# Patient Record
Sex: MALE | Race: BLACK OR AFRICAN AMERICAN | Employment: UNEMPLOYED | URBAN - METROPOLITAN AREA
[De-identification: names, ages, dates, MRNs, and addresses within clinical notes are randomized per-mention and may not be internally consistent; named-entity substitution may affect disease eponyms.]

---

## 2022-01-12 ENCOUNTER — HOSPITAL ENCOUNTER (EMERGENCY)
Facility: CLINIC | Age: 37
Discharge: HOME | End: 2022-01-12
Attending: EMERGENCY MEDICINE | Admitting: EMERGENCY MEDICINE

## 2022-01-12 ENCOUNTER — APPOINTMENT (OUTPATIENT)
Dept: CT IMAGING | Facility: CLINIC | Age: 37
End: 2022-01-12
Attending: EMERGENCY MEDICINE

## 2022-01-12 VITALS
SYSTOLIC BLOOD PRESSURE: 131 MMHG | HEIGHT: 74 IN | RESPIRATION RATE: 29 BRPM | OXYGEN SATURATION: 96 % | HEART RATE: 83 BPM | DIASTOLIC BLOOD PRESSURE: 81 MMHG | TEMPERATURE: 98 F | WEIGHT: 213.63 LBS | BODY MASS INDEX: 27.42 KG/M2

## 2022-01-12 DIAGNOSIS — E87.6 HYPOKALEMIA: ICD-10-CM

## 2022-01-12 DIAGNOSIS — J18.9 PNEUMONIA OF LEFT LUNG DUE TO INFECTIOUS ORGANISM, UNSPECIFIED PART OF LUNG: ICD-10-CM

## 2022-01-12 LAB
ALBUMIN SERPL-MCNC: 3.1 G/DL (ref 3.5–5)
ALP SERPL-CCNC: 66 U/L (ref 45–120)
ALT SERPL W P-5'-P-CCNC: 21 U/L (ref 0–45)
ANION GAP SERPL CALCULATED.3IONS-SCNC: 12 MMOL/L (ref 5–18)
AST SERPL W P-5'-P-CCNC: 47 U/L (ref 0–40)
ATRIAL RATE - MUSE: 83 BPM
BASOPHILS # BLD MANUAL: 0 10E3/UL (ref 0–0.2)
BASOPHILS NFR BLD MANUAL: 0 %
BILIRUB SERPL-MCNC: 0.4 MG/DL (ref 0–1)
BUN SERPL-MCNC: 8 MG/DL (ref 8–22)
CALCIUM SERPL-MCNC: 8.6 MG/DL (ref 8.5–10.5)
CHLORIDE BLD-SCNC: 104 MMOL/L (ref 98–107)
CO2 SERPL-SCNC: 29 MMOL/L (ref 22–31)
CREAT SERPL-MCNC: 0.69 MG/DL (ref 0.7–1.3)
D DIMER PPP FEU-MCNC: 2.15 UG/ML FEU (ref 0–0.5)
DIASTOLIC BLOOD PRESSURE - MUSE: 69 MMHG
EOSINOPHIL # BLD MANUAL: 0 10E3/UL (ref 0–0.7)
EOSINOPHIL NFR BLD MANUAL: 0 %
ERYTHROCYTE [DISTWIDTH] IN BLOOD BY AUTOMATED COUNT: 13 % (ref 10–15)
FLUAV RNA SPEC QL NAA+PROBE: NEGATIVE
FLUBV RNA RESP QL NAA+PROBE: NEGATIVE
GFR SERPL CREATININE-BSD FRML MDRD: >90 ML/MIN/1.73M2
GLUCOSE BLD-MCNC: 112 MG/DL (ref 70–125)
HCT VFR BLD AUTO: 35.5 % (ref 40–53)
HGB BLD-MCNC: 12.1 G/DL (ref 13.3–17.7)
INTERPRETATION ECG - MUSE: NORMAL
LYMPHOCYTES # BLD MANUAL: 3.7 10E3/UL (ref 0.8–5.3)
LYMPHOCYTES NFR BLD MANUAL: 28 %
MCH RBC QN AUTO: 33.6 PG (ref 26.5–33)
MCHC RBC AUTO-ENTMCNC: 34.1 G/DL (ref 31.5–36.5)
MCV RBC AUTO: 99 FL (ref 78–100)
MONOCYTES # BLD MANUAL: 1.6 10E3/UL (ref 0–1.3)
MONOCYTES NFR BLD MANUAL: 12 %
NEUTROPHILS # BLD MANUAL: 8 10E3/UL (ref 1.6–8.3)
NEUTROPHILS NFR BLD MANUAL: 60 %
P AXIS - MUSE: 48 DEGREES
PLAT MORPH BLD: ABNORMAL
PLATELET # BLD AUTO: 413 10E3/UL (ref 150–450)
POTASSIUM BLD-SCNC: 2.7 MMOL/L (ref 3.5–5)
PR INTERVAL - MUSE: 128 MS
PROT SERPL-MCNC: 7.7 G/DL (ref 6–8)
QRS DURATION - MUSE: 106 MS
QT - MUSE: 400 MS
QTC - MUSE: 470 MS
R AXIS - MUSE: 45 DEGREES
RBC # BLD AUTO: 3.6 10E6/UL (ref 4.4–5.9)
RBC MORPH BLD: ABNORMAL
SARS-COV-2 RNA RESP QL NAA+PROBE: NEGATIVE
SODIUM SERPL-SCNC: 145 MMOL/L (ref 136–145)
SYSTOLIC BLOOD PRESSURE - MUSE: 114 MMHG
T AXIS - MUSE: -32 DEGREES
TROPONIN I SERPL-MCNC: <0.01 NG/ML (ref 0–0.29)
VENTRICULAR RATE- MUSE: 83 BPM
WBC # BLD AUTO: 13.3 10E3/UL (ref 4–11)

## 2022-01-12 PROCEDURE — 85027 COMPLETE CBC AUTOMATED: CPT | Performed by: EMERGENCY MEDICINE

## 2022-01-12 PROCEDURE — 87636 SARSCOV2 & INF A&B AMP PRB: CPT | Performed by: EMERGENCY MEDICINE

## 2022-01-12 PROCEDURE — 258N000003 HC RX IP 258 OP 636: Performed by: EMERGENCY MEDICINE

## 2022-01-12 PROCEDURE — 80053 COMPREHEN METABOLIC PANEL: CPT | Performed by: EMERGENCY MEDICINE

## 2022-01-12 PROCEDURE — 71275 CT ANGIOGRAPHY CHEST: CPT

## 2022-01-12 PROCEDURE — 84484 ASSAY OF TROPONIN QUANT: CPT | Performed by: EMERGENCY MEDICINE

## 2022-01-12 PROCEDURE — 85379 FIBRIN DEGRADATION QUANT: CPT | Performed by: EMERGENCY MEDICINE

## 2022-01-12 PROCEDURE — 96374 THER/PROPH/DIAG INJ IV PUSH: CPT | Mod: 59

## 2022-01-12 PROCEDURE — 250N000011 HC RX IP 250 OP 636: Performed by: EMERGENCY MEDICINE

## 2022-01-12 PROCEDURE — 36415 COLL VENOUS BLD VENIPUNCTURE: CPT | Performed by: EMERGENCY MEDICINE

## 2022-01-12 PROCEDURE — 99285 EMERGENCY DEPT VISIT HI MDM: CPT | Mod: 25

## 2022-01-12 PROCEDURE — C9803 HOPD COVID-19 SPEC COLLECT: HCPCS

## 2022-01-12 PROCEDURE — 250N000013 HC RX MED GY IP 250 OP 250 PS 637: Performed by: EMERGENCY MEDICINE

## 2022-01-12 PROCEDURE — 93005 ELECTROCARDIOGRAM TRACING: CPT | Performed by: EMERGENCY MEDICINE

## 2022-01-12 PROCEDURE — 96361 HYDRATE IV INFUSION ADD-ON: CPT

## 2022-01-12 RX ORDER — KETOROLAC TROMETHAMINE 15 MG/ML
15 INJECTION, SOLUTION INTRAMUSCULAR; INTRAVENOUS ONCE
Status: COMPLETED | OUTPATIENT
Start: 2022-01-12 | End: 2022-01-12

## 2022-01-12 RX ORDER — CEFDINIR 300 MG/1
300 CAPSULE ORAL 2 TIMES DAILY
Qty: 14 CAPSULE | Refills: 0 | Status: SHIPPED | OUTPATIENT
Start: 2022-01-12 | End: 2022-01-19

## 2022-01-12 RX ORDER — SODIUM CHLORIDE 9 MG/ML
INJECTION, SOLUTION INTRAVENOUS CONTINUOUS
Status: DISCONTINUED | OUTPATIENT
Start: 2022-01-12 | End: 2022-01-12 | Stop reason: HOSPADM

## 2022-01-12 RX ORDER — IOPAMIDOL 755 MG/ML
100 INJECTION, SOLUTION INTRAVASCULAR ONCE
Status: COMPLETED | OUTPATIENT
Start: 2022-01-12 | End: 2022-01-12

## 2022-01-12 RX ORDER — AZITHROMYCIN 250 MG/1
TABLET, FILM COATED ORAL
Qty: 6 TABLET | Refills: 0 | Status: SHIPPED | OUTPATIENT
Start: 2022-01-12 | End: 2022-01-17

## 2022-01-12 RX ORDER — POTASSIUM CHLORIDE 1.5 G/1.58G
40 POWDER, FOR SOLUTION ORAL ONCE
Status: COMPLETED | OUTPATIENT
Start: 2022-01-12 | End: 2022-01-12

## 2022-01-12 RX ADMIN — IOPAMIDOL 75 ML: 755 INJECTION, SOLUTION INTRAVENOUS at 05:00

## 2022-01-12 RX ADMIN — POTASSIUM CHLORIDE 40 MEQ: 1.5 POWDER, FOR SOLUTION ORAL at 04:22

## 2022-01-12 RX ADMIN — SODIUM CHLORIDE 1000 ML: 9 INJECTION, SOLUTION INTRAVENOUS at 03:35

## 2022-01-12 RX ADMIN — KETOROLAC TROMETHAMINE 15 MG: 15 INJECTION, SOLUTION INTRAMUSCULAR; INTRAVENOUS at 03:35

## 2022-01-12 ASSESSMENT — ENCOUNTER SYMPTOMS
DIARRHEA: 0
COUGH: 1
SHORTNESS OF BREATH: 1
FEVER: 1
ABDOMINAL PAIN: 0
NAUSEA: 1

## 2022-01-12 ASSESSMENT — MIFFLIN-ST. JEOR: SCORE: 1978.75

## 2022-01-12 NOTE — ED PROVIDER NOTES
EMERGENCY DEPARTMENT ENCOUNTER      NAME: Roland Londono  AGE: 36 year old male  YOB: 1985  MRN: [id number]  EVALUATION DATE & TIME: 1/12/2022  2:55 AM    PCP: No primary care provider on file.    ED PROVIDER: True Negron M.D.      Chief Complaint   Patient presents with     Covid Concern         FINAL IMPRESSION:  1. Pneumonia of left lung due to infectious organism, unspecified part of lung    2. Hypokalemia          ED COURSE & MEDICAL DECISION MAKING:    Pertinent Labs & Imaging studies reviewed. (See chart for details)  36 year old male presents to the Emergency Department for evaluation of cough shortness of breath.  Having some left-sided chest pain.  Thinks he may have had COVID but was never tested.  COVID test was actually negative here.  He has a mildly elevated white count..  Did get a CT scan as his D-dimer was elevated.  This does not show PE but does show left-sided pneumonia.  We will treat him with antibiotics for this.  EKG done here does not show acute abnormalities.  Does have voltage criteria for ventricular hypertrophy.  He has no previous EKG.  I do not think this is new.  He does have a low potassium.  Given oral and IV potassium here.  Patient feeling better after IV fluids, IV Toradol and potassium.  At this time I do think he safe for discharge.  Oxygen saturation is normal.  We will treat him with azithromycin and Omnicef at home.  Follow-up with primary.  I did get a referral from primary care provider as he does not have 1.  3:15 AM I met with the patient to gather history and to perform my initial exam. I discussed the plan for care while in the Emergency Department. PPE: N95 mask, surgical mask, eye shield, and gloves.    At the conclusion of the encounter I discussed the results of all of the tests and the disposition. The questions were answered. The patient or family acknowledged understanding and was agreeable with the care plan.       MEDICATIONS GIVEN IN THE  EMERGENCY:  Medications   0.9% sodium chloride BOLUS (0 mLs Intravenous Stopped 1/12/22 0422)     Followed by   sodium chloride 0.9% infusion (has no administration in time range)   ketorolac (TORADOL) injection 15 mg (15 mg Intravenous Given 1/12/22 0335)   potassium chloride (KLOR-CON) Packet 40 mEq (40 mEq Oral Given 1/12/22 0422)   iopamidol (ISOVUE-370) solution 100 mL (75 mLs Intravenous Given 1/12/22 0500)       NEW PRESCRIPTIONS STARTED AT TODAY'S ER VISIT  Discharge Medication List as of 1/12/2022  5:32 AM      START taking these medications    Details   azithromycin (ZITHROMAX Z-SHARON) 250 MG tablet Two tablets on the first day, then one tablet daily for the next 4 days, Disp-6 tablet, R-0, Local Print      cefdinir (OMNICEF) 300 MG capsule Take 1 capsule (300 mg) by mouth 2 times daily for 7 days, Disp-14 capsule, R-0, Local Print                =================================================================    HPI    Patient information was obtained from: Patient     Use of : N/A       Roland Londono is a 36 year old male with a pertinent history of alcohol dependence who presents to this ED by walk in for evaluation of COVID-19 concern.  Patient is having left-sided chest pain.  He states for 2 weeks he has had cough and shortness of breath.  He thought he had COVID but was never tested.  He is having worsening pain in his left side.  This wraps around to his back.  He has had worsening shortness of breath.  He is a smoker.  Had some nausea which is improving.  Has not been eating much.  Has not had much of an appetite.  Decreased bowel movements.  No difficulty urinating.  He is not vaccinated versus COVID    REVIEW OF SYSTEMS   Review of Systems   Constitutional: Positive for fever.   Respiratory: Positive for cough and shortness of breath.    Cardiovascular: Positive for chest pain.   Gastrointestinal: Positive for nausea. Negative for abdominal pain and diarrhea.   All other systems  "reviewed and are negative.       PAST MEDICAL HISTORY:  No past medical history on file.    PAST SURGICAL HISTORY:  No past surgical history on file.        CURRENT MEDICATIONS:    Current Facility-Administered Medications   Medication     sodium chloride 0.9% infusion     Current Outpatient Medications   Medication     azithromycin (ZITHROMAX Z-SHARON) 250 MG tablet     cefdinir (OMNICEF) 300 MG capsule     ibuprofen (ADVIL,MOTRIN) 800 MG tablet     ondansetron (ZOFRAN ODT) 4 MG disintegrating tablet         ALLERGIES:  No Known Allergies    FAMILY HISTORY:  Family History   Problem Relation Age of Onset     Asthma Daughter        SOCIAL HISTORY:   Social History     Socioeconomic History     Marital status: Single     Spouse name: Not on file     Number of children: Not on file     Years of education: Not on file     Highest education level: Not on file   Occupational History     Not on file   Tobacco Use     Smoking status: Current Every Day Smoker     Packs/day: 0.50     Years: 10.00     Pack years: 5.00     Smokeless tobacco: Not on file   Substance and Sexual Activity     Alcohol use: Yes     Alcohol/week: 15.0 standard drinks     Drug use: Yes     Types: Marijuana     Sexual activity: Yes     Partners: Female   Other Topics Concern     Not on file   Social History Narrative     Not on file     Social Determinants of Health     Financial Resource Strain: Not on file   Food Insecurity: Not on file   Transportation Needs: Not on file   Physical Activity: Not on file   Stress: Not on file   Social Connections: Not on file   Intimate Partner Violence: Not on file   Housing Stability: Not on file       VITALS:  /81   Pulse 83   Temp 98  F (36.7  C) (Oral)   Resp 29   Ht 1.88 m (6' 2\")   Wt 96.9 kg (213 lb 10 oz)   SpO2 96%   BMI 27.43 kg/m      PHYSICAL EXAM    Physical Exam  Constitutional:       General: He is not in acute distress.     Appearance: He is not diaphoretic.   HENT:      Head: Atraumatic. "   Eyes:      General: No scleral icterus.     Pupils: Pupils are equal, round, and reactive to light.   Cardiovascular:      Heart sounds: Normal heart sounds.   Pulmonary:      Effort: No respiratory distress.      Breath sounds: Normal breath sounds.   Abdominal:      General: Bowel sounds are normal.      Palpations: Abdomen is soft.      Tenderness: There is no abdominal tenderness.   Musculoskeletal:         General: No tenderness.   Skin:     General: Skin is warm.      Findings: No rash.           LAB:  All pertinent labs reviewed and interpreted.  Labs Ordered and Resulted from Time of ED Arrival to Time of ED Departure   D DIMER QUANTITATIVE - Abnormal       Result Value    D-Dimer Quantitative 2.15 (*)    COMPREHENSIVE METABOLIC PANEL - Abnormal    Sodium 145      Potassium 2.7 (*)     Chloride 104      Carbon Dioxide (CO2) 29      Anion Gap 12      Urea Nitrogen 8      Creatinine 0.69 (*)     Calcium 8.6      Glucose 112      Alkaline Phosphatase 66      AST 47 (*)     ALT 21      Protein Total 7.7      Albumin 3.1 (*)     Bilirubin Total 0.4      GFR Estimate >90     CBC WITH PLATELETS AND DIFFERENTIAL - Abnormal    WBC Count 13.3 (*)     RBC Count 3.60 (*)     Hemoglobin 12.1 (*)     Hematocrit 35.5 (*)     MCV 99      MCH 33.6 (*)     MCHC 34.1      RDW 13.0      Platelet Count 413     DIFFERENTIAL - Abnormal    % Neutrophils 60      % Lymphocytes 28      % Monocytes 12      % Eosinophils 0      % Basophils 0      Absolute Neutrophils 8.0      Absolute Lymphocytes 3.7      Absolute Monocytes 1.6 (*)     Absolute Eosinophils 0.0      Absolute Basophils 0.0      RBC Morphology Confirmed RBC Indices      Platelet Assessment        Value: Automated Count Confirmed. Platelet morphology is normal.   TROPONIN I - Normal    Troponin I <0.01     INFLUENZA A/B & SARS-COV2 PCR MULTIPLEX - Normal    Influenza A PCR Negative      Influenza B PCR Negative      SARS CoV2 PCR Negative         RADIOLOGY:  Reviewed all  pertinent imaging. Please see official radiology report.  CT Chest Pulmonary Embolism w Contrast   Final Result   IMPRESSION:   1.  No pulmonary embolus, aortic dissection, or aneurysm.   2.  Left upper and left lower lobe groundglass and reticular consolidation, may reflect pneumonia in the proper clinical context, recommend follow-up to resolution.         Imaging features are atypical or uncommonly reported for (COVID-19) pneumonia. Alternative diagnoses should be considered.          EKG:    Performed at:333  Impression: Sinus rhythm.  Will treat anterior for LVH.  No previous EKG available.  Sinus rhythm intergrade of 83.  MI of 128.  .  QTc 470    I have independently reviewed and interpreted the EKG(s) documented above.      I, Kelvin Gutierres, am serving as a scribe to document services personally performed by Dr. True Negron, based on my observation and the provider's statements to me. I, True Negron MD attest that Kelvin Gutierres is acting in a scribe capacity, has observed my performance of the services and has documented them in accordance with my direction.    True Negron M.D.  Emergency Medicine  [facility] EMERGENCY ROOM  [address] [facility] [id number] [phone]  Dept: [phone]     True Negron MD  01/12/22 7639

## 2022-01-12 NOTE — ED TRIAGE NOTES
Patient had covid-like symptoms that started around kishore but never tested. Has started having pain below his ribs on the left side that wraps around to his back; also states he feels like he's been increasingly SOB during this time; he does smoke cigarettes   
no abdominal pain, no bloating, no constipation, no diarrhea, no nausea and no vomiting.

## 2022-02-07 ENCOUNTER — HOSPITAL ENCOUNTER (EMERGENCY)
Facility: CLINIC | Age: 37
Discharge: HOME | End: 2022-02-07
Attending: FAMILY MEDICINE | Admitting: FAMILY MEDICINE

## 2022-02-07 ENCOUNTER — APPOINTMENT (OUTPATIENT)
Dept: RADIOLOGY | Facility: CLINIC | Age: 37
End: 2022-02-07
Attending: FAMILY MEDICINE

## 2022-02-07 VITALS
SYSTOLIC BLOOD PRESSURE: 161 MMHG | HEIGHT: 74 IN | RESPIRATION RATE: 19 BRPM | WEIGHT: 217 LBS | BODY MASS INDEX: 27.85 KG/M2 | OXYGEN SATURATION: 100 % | TEMPERATURE: 98 F | DIASTOLIC BLOOD PRESSURE: 94 MMHG | HEART RATE: 86 BPM

## 2022-02-07 DIAGNOSIS — F10.920 ALCOHOLIC INTOXICATION WITHOUT COMPLICATION (H): ICD-10-CM

## 2022-02-07 DIAGNOSIS — R55 SYNCOPE, UNSPECIFIED SYNCOPE TYPE: ICD-10-CM

## 2022-02-07 LAB
ANION GAP SERPL CALCULATED.3IONS-SCNC: 21 MMOL/L (ref 5–18)
BASOPHILS # BLD AUTO: 0 10E3/UL (ref 0–0.2)
BASOPHILS NFR BLD AUTO: 1 %
BUN SERPL-MCNC: 8 MG/DL (ref 8–22)
CALCIUM SERPL-MCNC: 9.8 MG/DL (ref 8.5–10.5)
CHLORIDE BLD-SCNC: 102 MMOL/L (ref 98–107)
CO2 SERPL-SCNC: 21 MMOL/L (ref 22–31)
CREAT SERPL-MCNC: 0.81 MG/DL (ref 0.7–1.3)
EOSINOPHIL # BLD AUTO: 0 10E3/UL (ref 0–0.7)
EOSINOPHIL NFR BLD AUTO: 0 %
ERYTHROCYTE [DISTWIDTH] IN BLOOD BY AUTOMATED COUNT: 14.6 % (ref 10–15)
GFR SERPL CREATININE-BSD FRML MDRD: >90 ML/MIN/1.73M2
GLUCOSE BLD-MCNC: 71 MG/DL (ref 70–125)
HCT VFR BLD AUTO: 37 % (ref 40–53)
HGB BLD-MCNC: 12.2 G/DL (ref 13.3–17.7)
IMM GRANULOCYTES # BLD: 0 10E3/UL
IMM GRANULOCYTES NFR BLD: 0 %
LYMPHOCYTES # BLD AUTO: 2.4 10E3/UL (ref 0.8–5.3)
LYMPHOCYTES NFR BLD AUTO: 34 %
MAGNESIUM SERPL-MCNC: 1.4 MG/DL (ref 1.8–2.6)
MCH RBC QN AUTO: 32.9 PG (ref 26.5–33)
MCHC RBC AUTO-ENTMCNC: 33 G/DL (ref 31.5–36.5)
MCV RBC AUTO: 100 FL (ref 78–100)
MONOCYTES # BLD AUTO: 0.9 10E3/UL (ref 0–1.3)
MONOCYTES NFR BLD AUTO: 12 %
NEUTROPHILS # BLD AUTO: 3.8 10E3/UL (ref 1.6–8.3)
NEUTROPHILS NFR BLD AUTO: 53 %
NRBC # BLD AUTO: 0 10E3/UL
NRBC BLD AUTO-RTO: 0 /100
PLATELET # BLD AUTO: 225 10E3/UL (ref 150–450)
POTASSIUM BLD-SCNC: 3.5 MMOL/L (ref 3.5–5)
RBC # BLD AUTO: 3.71 10E6/UL (ref 4.4–5.9)
SODIUM SERPL-SCNC: 144 MMOL/L (ref 136–145)
WBC # BLD AUTO: 7.2 10E3/UL (ref 4–11)

## 2022-02-07 PROCEDURE — 96361 HYDRATE IV INFUSION ADD-ON: CPT

## 2022-02-07 PROCEDURE — 85025 COMPLETE CBC W/AUTO DIFF WBC: CPT | Performed by: FAMILY MEDICINE

## 2022-02-07 PROCEDURE — 96365 THER/PROPH/DIAG IV INF INIT: CPT

## 2022-02-07 PROCEDURE — 83735 ASSAY OF MAGNESIUM: CPT | Performed by: FAMILY MEDICINE

## 2022-02-07 PROCEDURE — 99285 EMERGENCY DEPT VISIT HI MDM: CPT | Mod: 25

## 2022-02-07 PROCEDURE — 250N000011 HC RX IP 250 OP 636: Performed by: FAMILY MEDICINE

## 2022-02-07 PROCEDURE — 258N000003 HC RX IP 258 OP 636: Performed by: FAMILY MEDICINE

## 2022-02-07 PROCEDURE — 71046 X-RAY EXAM CHEST 2 VIEWS: CPT

## 2022-02-07 PROCEDURE — 93005 ELECTROCARDIOGRAM TRACING: CPT | Performed by: FAMILY MEDICINE

## 2022-02-07 PROCEDURE — 36415 COLL VENOUS BLD VENIPUNCTURE: CPT | Performed by: FAMILY MEDICINE

## 2022-02-07 PROCEDURE — 82310 ASSAY OF CALCIUM: CPT | Performed by: FAMILY MEDICINE

## 2022-02-07 RX ORDER — MAGNESIUM SULFATE HEPTAHYDRATE 40 MG/ML
2 INJECTION, SOLUTION INTRAVENOUS ONCE
Status: COMPLETED | OUTPATIENT
Start: 2022-02-07 | End: 2022-02-07

## 2022-02-07 RX ADMIN — SODIUM CHLORIDE 1000 ML: 9 INJECTION, SOLUTION INTRAVENOUS at 14:00

## 2022-02-07 RX ADMIN — MAGNESIUM SULFATE HEPTAHYDRATE 2 G: 40 INJECTION, SOLUTION INTRAVENOUS at 15:15

## 2022-02-07 ASSESSMENT — ENCOUNTER SYMPTOMS
VOMITING: 0
SHORTNESS OF BREATH: 1
DIARRHEA: 0

## 2022-02-07 ASSESSMENT — MIFFLIN-ST. JEOR: SCORE: 1994.06

## 2022-02-07 NOTE — ED PROVIDER NOTES
EMERGENCY DEPARTMENT ENCOUNTER      NAME: Roland Londono  AGE: 36 year old male  YOB: 1985  MRN: [id number]  EVALUATION DATE & TIME: 2022  1:19 PM    PCP: No primary care provider on file.    ED PROVIDER: Kurt Yates M.D.    Chief Complaint   Patient presents with     Alcohol Intoxication       FINAL IMPRESSION:  1. Syncope, unspecified syncope type    2. Alcoholic intoxication without complication (H)        ED COURSE & MEDICAL DECISION MAKIN:36 PM I introduced myself to the patient, performed my physical exam, and discussed plan for ED workup.  Differential diagnosis includes but not limited to vasovagal syncope, dehydration, electrolyte disturbance, dysrhythmia, myocardial infarction, pulmonary embolism, urinary tract infection, pneumonia, intracranial hemorrhage.  Patient presents with syncopal episode at home.  Admits to alcohol use.  Otherwise is feeling well.  Recently diagnosed with pneumonia but did not take antibiotics, no crackles on lung exam, no hypoxia, tachycardia, or fever.  Labs and chest x-ray ordered.  No external signs of head trauma and patient denies any side.  3:23 PM Updated the patient on laboratory and imaging results. Discussed plan for discharge and the patient is in agreement with the plan.  Patient is stable for discharge.  Mild hypomagnesemia, replaced.  IV fluids are ordered.    Pertinent Labs & Imaging studies personally reviewed and interpreted by me. (See chart for details)     At the conclusion of the encounter I discussed the results of all of the tests and the disposition. The questions were answered. The patient or family acknowledged understanding and was agreeable with the care plan.     PROCEDURES:   Procedures    MEDICATIONS GIVEN IN THE EMERGENCY:  Medications   magnesium sulfate 2 g in water intermittent infusion (2 g Intravenous New Bag 22 1515)   0.9% sodium chloride BOLUS (0 mLs Intravenous Stopped 22 1516)       NEW  PRESCRIPTIONS STARTED AT TODAY'S ER VISIT  New Prescriptions    No medications on file       =================================================================    HPI    Patient information was obtained from: Patient      Roland Londono is a 36 year old who presents to this ED via EMS for evaluation of alcohol intoxication and syncope.    The patient is presenting to the ED via EMS after he passed out in the bathroom today. His family was concerned and called 911 because the patient had been drinking and not taking his medications. He states that he just passed out while going to get a drink of water. He did not hit his head when he fell. He also reports back pain after hurting himself yesterday. He is not sure how he hurt his back.    He reports being diagnosed with pneumonia last week. He has had ongoing shortness of breath for awhile. He was prescribed two medications after his previous ED visit, but has not started the medications.    Denies vomiting, diarrhea, and chest pain.    He is a smoker and reports regular alcohol use. Denies drug use.    Chart Review  1/12/22: ED Evaluation. Patient presented with cough, shortness of breath, and some left-sided chest pain. COVID test negative. D-dimer was elevated so CT was obtained to rule out PE. CT was negative for PE but showed left-sided pneumonia. EKG without acute abnormalities. Discharged with prescriptions for azithromycin and Omnicef.    REVIEW OF SYSTEMS   Review of Systems   Respiratory: Positive for shortness of breath.    Cardiovascular: Negative for chest pain.   Gastrointestinal: Negative for diarrhea and vomiting.   Neurological: Positive for syncope.   All other systems reviewed and negative    PAST MEDICAL HISTORY:  History reviewed. No pertinent past medical history.    PAST SURGICAL HISTORY:  History reviewed. No pertinent surgical history.    CURRENT MEDICATIONS:    Current Facility-Administered Medications   Medication     magnesium sulfate 2 g in  "water intermittent infusion     No current outpatient medications on file.       ALLERGIES:  No Known Allergies    FAMILY HISTORY:  History reviewed. No pertinent family history.    SOCIAL HISTORY:   Social History     Socioeconomic History     Marital status: Single     Spouse name: Not on file     Number of children: Not on file     Years of education: Not on file     Highest education level: Not on file   Occupational History     Not on file   Tobacco Use     Smoking status: Not on file     Smokeless tobacco: Not on file   Substance and Sexual Activity     Alcohol use: Not on file     Drug use: Not on file     Sexual activity: Not on file   Other Topics Concern     Not on file   Social History Narrative     Not on file     Social Determinants of Health     Financial Resource Strain: Not on file   Food Insecurity: Not on file   Transportation Needs: Not on file   Physical Activity: Not on file   Stress: Not on file   Social Connections: Not on file   Intimate Partner Violence: Not on file   Housing Stability: Not on file       VITALS:  BP (!) 161/94   Pulse 86   Temp 98  F (36.7  C) (Oral)   Resp 19   Ht 1.88 m (6' 2\")   Wt 98.4 kg (217 lb)   SpO2 100%   BMI 27.86 kg/m      PHYSICAL EXAM:  Physical Exam  Vitals and nursing note reviewed.   Constitutional:       Appearance: Normal appearance.   HENT:      Head: Normocephalic and atraumatic.      Right Ear: External ear normal.      Left Ear: External ear normal.      Nose: Nose normal.      Mouth/Throat:      Mouth: Mucous membranes are moist.   Eyes:      Extraocular Movements: Extraocular movements intact.      Conjunctiva/sclera: Conjunctivae normal.      Pupils: Pupils are equal, round, and reactive to light.   Cardiovascular:      Rate and Rhythm: Normal rate and regular rhythm.   Pulmonary:      Effort: Pulmonary effort is normal.      Breath sounds: Normal breath sounds. No wheezing or rales.   Abdominal:      General: Abdomen is flat. There is no " distension.      Palpations: Abdomen is soft.      Tenderness: There is no abdominal tenderness. There is no guarding.   Musculoskeletal:         General: Normal range of motion.      Cervical back: Normal range of motion and neck supple.      Right lower leg: No edema.      Left lower leg: No edema.   Lymphadenopathy:      Cervical: No cervical adenopathy.   Skin:     General: Skin is warm and dry.   Neurological:      General: No focal deficit present.      Mental Status: He is alert and oriented to person, place, and time. Mental status is at baseline.      Comments: No gross focal neurologic deficits   Psychiatric:         Mood and Affect: Mood normal.         Behavior: Behavior normal.         Thought Content: Thought content normal.          LAB:  All pertinent labs reviewed and interpreted.  Results for orders placed or performed during the hospital encounter of 02/07/22   Chest XR,  PA & LAT    Impression    IMPRESSION: Negative chest.  The lungs are clear and there are no pleural effusions. Normal heart size.   Basic metabolic panel   Result Value Ref Range    Sodium 144 136 - 145 mmol/L    Potassium 3.5 3.5 - 5.0 mmol/L    Chloride 102 98 - 107 mmol/L    Carbon Dioxide (CO2) 21 (L) 22 - 31 mmol/L    Anion Gap 21 (H) 5 - 18 mmol/L    Urea Nitrogen 8 8 - 22 mg/dL    Creatinine 0.81 0.70 - 1.30 mg/dL    Calcium 9.8 8.5 - 10.5 mg/dL    Glucose 71 70 - 125 mg/dL    GFR Estimate >90 >60 mL/min/1.73m2   Result Value Ref Range    Magnesium 1.4 (L) 1.8 - 2.6 mg/dL   CBC with platelets and differential   Result Value Ref Range    WBC Count 7.2 4.0 - 11.0 10e3/uL    RBC Count 3.71 (L) 4.40 - 5.90 10e6/uL    Hemoglobin 12.2 (L) 13.3 - 17.7 g/dL    Hematocrit 37.0 (L) 40.0 - 53.0 %     78 - 100 fL    MCH 32.9 26.5 - 33.0 pg    MCHC 33.0 31.5 - 36.5 g/dL    RDW 14.6 10.0 - 15.0 %    Platelet Count 225 150 - 450 10e3/uL    % Neutrophils 53 %    % Lymphocytes 34 %    % Monocytes 12 %    % Eosinophils 0 %    %  Basophils 1 %    % Immature Granulocytes 0 %    NRBCs per 100 WBC 0 <1 /100    Absolute Neutrophils 3.8 1.6 - 8.3 10e3/uL    Absolute Lymphocytes 2.4 0.8 - 5.3 10e3/uL    Absolute Monocytes 0.9 0.0 - 1.3 10e3/uL    Absolute Eosinophils 0.0 0.0 - 0.7 10e3/uL    Absolute Basophils 0.0 0.0 - 0.2 10e3/uL    Absolute Immature Granulocytes 0.0 <=0.4 10e3/uL    Absolute NRBCs 0.0 10e3/uL       RADIOLOGY:  Reviewed all pertinent imaging. Please see official radiology report.  Chest XR,  PA & LAT   Final Result   IMPRESSION: Negative chest.  The lungs are clear and there are no pleural effusions. Normal heart size.        EKG:    Performed at: 1:44 PM  Impression: Normal sinus rhythm with frequent PVCs, nonspecific T wave changes, no acute ischemic changes  Rate: 84  Rhythm: Sinus  Axis: Normal  MT Interval: 128  QRS Interval: 100  QTc Interval: 425  ST Changes: No acute ischemic change  Comparison: No prior    I have independently reviewed and interpreted the EKG(s) documented above.    I, Edison Loco, am serving as a scribe to document services personally performed by Dr. Yates based on my observation and the provider's statements to me. I, Kurt Yates MD attest that Edison Loco is acting in a scribe capacity, has observed my performance of the services and has documented them in accordance with my direction.    Kurt Yates M.D.  Emergency Medicine  [facility] EMERGENCY ROOM  [address] [id number] [phone]  Dept: [phone]     Kurt Yates MD  02/07/22 7091

## 2022-02-07 NOTE — ED TRIAGE NOTES
Drinking today. Family called 911 concerned because he passed out in bathroom and hasn't been taking medications. Diagnosed with pneumonia recently.

## 2022-03-09 LAB
ATRIAL RATE - MUSE: 84 BPM
DIASTOLIC BLOOD PRESSURE - MUSE: 102 MMHG
INTERPRETATION ECG - MUSE: NORMAL
P AXIS - MUSE: 44 DEGREES
PR INTERVAL - MUSE: 128 MS
QRS DURATION - MUSE: 100 MS
QT - MUSE: 360 MS
QTC - MUSE: 425 MS
R AXIS - MUSE: 47 DEGREES
SYSTOLIC BLOOD PRESSURE - MUSE: 186 MMHG
T AXIS - MUSE: 19 DEGREES
VENTRICULAR RATE- MUSE: 84 BPM

## 2025-07-13 ENCOUNTER — HOSPITAL ENCOUNTER (EMERGENCY)
Facility: CLINIC | Age: 40
Discharge: ANOTHER HOSPITAL | End: 2025-07-14
Attending: STUDENT IN AN ORGANIZED HEALTH CARE EDUCATION/TRAINING PROGRAM | Admitting: STUDENT IN AN ORGANIZED HEALTH CARE EDUCATION/TRAINING PROGRAM

## 2025-07-13 DIAGNOSIS — E87.29 HIGH ANION GAP METABOLIC ACIDOSIS: ICD-10-CM

## 2025-07-13 DIAGNOSIS — R56.9 ALCOHOL WITHDRAWAL SEIZURE WITHOUT COMPLICATION (H): ICD-10-CM

## 2025-07-13 DIAGNOSIS — F10.930 ALCOHOL WITHDRAWAL SEIZURE WITHOUT COMPLICATION (H): ICD-10-CM

## 2025-07-13 DIAGNOSIS — N17.9 AKI (ACUTE KIDNEY INJURY): ICD-10-CM

## 2025-07-13 DIAGNOSIS — E87.6 HYPOKALEMIA: ICD-10-CM

## 2025-07-13 DIAGNOSIS — E83.42 HYPOMAGNESEMIA: ICD-10-CM

## 2025-07-13 DIAGNOSIS — R31.29 MICROSCOPIC HEMATURIA: ICD-10-CM

## 2025-07-13 LAB
ALBUMIN SERPL BCG-MCNC: 4.8 G/DL (ref 3.5–5.2)
ALP SERPL-CCNC: 80 U/L (ref 40–150)
ALT SERPL W P-5'-P-CCNC: 40 U/L (ref 0–70)
ANION GAP SERPL CALCULATED.3IONS-SCNC: 32 MMOL/L (ref 7–15)
AST SERPL W P-5'-P-CCNC: 162 U/L (ref 0–45)
ATRIAL RATE - MUSE: 114 BPM
BASE EXCESS BLDV CALC-SCNC: -0.8 MMOL/L (ref -3–3)
BASOPHILS # BLD AUTO: 0 10E3/UL (ref 0–0.2)
BASOPHILS NFR BLD AUTO: 0 %
BILIRUB SERPL-MCNC: 1.6 MG/DL
BILIRUBIN DIRECT (ROCHE PRO & PURE): 0.96 MG/DL (ref 0–0.45)
BUN SERPL-MCNC: 17.2 MG/DL (ref 6–20)
CALCIUM SERPL-MCNC: 10.4 MG/DL (ref 8.8–10.4)
CHLORIDE SERPL-SCNC: 89 MMOL/L (ref 98–107)
CREAT SERPL-MCNC: 1.48 MG/DL (ref 0.67–1.17)
DIASTOLIC BLOOD PRESSURE - MUSE: 80 MMHG
EGFRCR SERPLBLD CKD-EPI 2021: 62 ML/MIN/1.73M2
EOSINOPHIL # BLD AUTO: 0 10E3/UL (ref 0–0.7)
EOSINOPHIL NFR BLD AUTO: 0 %
ERYTHROCYTE [DISTWIDTH] IN BLOOD BY AUTOMATED COUNT: 14.4 % (ref 10–15)
ETHANOL SERPL-MCNC: <0.01 G/DL
GLUCOSE SERPL-MCNC: 161 MG/DL (ref 70–99)
HCO3 BLDV-SCNC: 24 MMOL/L (ref 21–28)
HCO3 SERPL-SCNC: 16 MMOL/L (ref 22–29)
HCT VFR BLD AUTO: 31.9 % (ref 40–53)
HGB BLD-MCNC: 11.1 G/DL (ref 13.3–17.7)
IMM GRANULOCYTES # BLD: 0.1 10E3/UL
IMM GRANULOCYTES NFR BLD: 1 %
INTERPRETATION ECG - MUSE: NORMAL
LACTATE SERPL-SCNC: 5.1 MMOL/L (ref 0.7–2)
LIPASE SERPL-CCNC: 10 U/L (ref 13–60)
LYMPHOCYTES # BLD AUTO: 1.4 10E3/UL (ref 0.8–5.3)
LYMPHOCYTES NFR BLD AUTO: 19 %
MAGNESIUM SERPL-MCNC: 1.1 MG/DL (ref 1.7–2.3)
MCH RBC QN AUTO: 35.7 PG (ref 26.5–33)
MCHC RBC AUTO-ENTMCNC: 34.8 G/DL (ref 31.5–36.5)
MCV RBC AUTO: 103 FL (ref 78–100)
MONOCYTES # BLD AUTO: 0.9 10E3/UL (ref 0–1.3)
MONOCYTES NFR BLD AUTO: 12 %
NEUTROPHILS # BLD AUTO: 5 10E3/UL (ref 1.6–8.3)
NEUTROPHILS NFR BLD AUTO: 67 %
NRBC # BLD AUTO: 0 10E3/UL
NRBC BLD AUTO-RTO: 0 /100
O2/TOTAL GAS SETTING VFR VENT: 0 %
OXYHGB MFR BLDV: 56 % (ref 70–75)
P AXIS - MUSE: 47 DEGREES
PCO2 BLDV: 38 MM HG (ref 40–50)
PH BLDV: 7.41 [PH] (ref 7.32–7.43)
PLATELET # BLD AUTO: 162 10E3/UL (ref 150–450)
PO2 BLDV: 34 MM HG (ref 25–47)
POTASSIUM SERPL-SCNC: 2.9 MMOL/L (ref 3.4–5.3)
PR INTERVAL - MUSE: 128 MS
PROT SERPL-MCNC: 8.5 G/DL (ref 6.4–8.3)
QRS DURATION - MUSE: 100 MS
QT - MUSE: 342 MS
QTC - MUSE: 471 MS
R AXIS - MUSE: 48 DEGREES
RBC # BLD AUTO: 3.11 10E6/UL (ref 4.4–5.9)
SAO2 % BLDV: 58.3 % (ref 70–75)
SODIUM SERPL-SCNC: 137 MMOL/L (ref 135–145)
SYSTOLIC BLOOD PRESSURE - MUSE: 123 MMHG
T AXIS - MUSE: 63 DEGREES
VENTRICULAR RATE- MUSE: 114 BPM
WBC # BLD AUTO: 7.5 10E3/UL (ref 4–11)

## 2025-07-13 PROCEDURE — 82805 BLOOD GASES W/O2 SATURATION: CPT | Performed by: STUDENT IN AN ORGANIZED HEALTH CARE EDUCATION/TRAINING PROGRAM

## 2025-07-13 PROCEDURE — 83735 ASSAY OF MAGNESIUM: CPT | Performed by: STUDENT IN AN ORGANIZED HEALTH CARE EDUCATION/TRAINING PROGRAM

## 2025-07-13 PROCEDURE — 99285 EMERGENCY DEPT VISIT HI MDM: CPT | Mod: 25 | Performed by: STUDENT IN AN ORGANIZED HEALTH CARE EDUCATION/TRAINING PROGRAM

## 2025-07-13 PROCEDURE — 96361 HYDRATE IV INFUSION ADD-ON: CPT | Performed by: STUDENT IN AN ORGANIZED HEALTH CARE EDUCATION/TRAINING PROGRAM

## 2025-07-13 PROCEDURE — 82077 ASSAY SPEC XCP UR&BREATH IA: CPT | Performed by: STUDENT IN AN ORGANIZED HEALTH CARE EDUCATION/TRAINING PROGRAM

## 2025-07-13 PROCEDURE — 36415 COLL VENOUS BLD VENIPUNCTURE: CPT | Performed by: STUDENT IN AN ORGANIZED HEALTH CARE EDUCATION/TRAINING PROGRAM

## 2025-07-13 PROCEDURE — 250N000011 HC RX IP 250 OP 636: Performed by: STUDENT IN AN ORGANIZED HEALTH CARE EDUCATION/TRAINING PROGRAM

## 2025-07-13 PROCEDURE — 80048 BASIC METABOLIC PNL TOTAL CA: CPT | Performed by: STUDENT IN AN ORGANIZED HEALTH CARE EDUCATION/TRAINING PROGRAM

## 2025-07-13 PROCEDURE — 83605 ASSAY OF LACTIC ACID: CPT | Performed by: STUDENT IN AN ORGANIZED HEALTH CARE EDUCATION/TRAINING PROGRAM

## 2025-07-13 PROCEDURE — 82248 BILIRUBIN DIRECT: CPT | Performed by: STUDENT IN AN ORGANIZED HEALTH CARE EDUCATION/TRAINING PROGRAM

## 2025-07-13 PROCEDURE — 85025 COMPLETE CBC W/AUTO DIFF WBC: CPT | Performed by: STUDENT IN AN ORGANIZED HEALTH CARE EDUCATION/TRAINING PROGRAM

## 2025-07-13 PROCEDURE — 96368 THER/DIAG CONCURRENT INF: CPT | Performed by: STUDENT IN AN ORGANIZED HEALTH CARE EDUCATION/TRAINING PROGRAM

## 2025-07-13 PROCEDURE — 258N000003 HC RX IP 258 OP 636: Performed by: STUDENT IN AN ORGANIZED HEALTH CARE EDUCATION/TRAINING PROGRAM

## 2025-07-13 PROCEDURE — 250N000013 HC RX MED GY IP 250 OP 250 PS 637: Performed by: STUDENT IN AN ORGANIZED HEALTH CARE EDUCATION/TRAINING PROGRAM

## 2025-07-13 PROCEDURE — 93005 ELECTROCARDIOGRAM TRACING: CPT | Performed by: STUDENT IN AN ORGANIZED HEALTH CARE EDUCATION/TRAINING PROGRAM

## 2025-07-13 PROCEDURE — 83690 ASSAY OF LIPASE: CPT | Performed by: STUDENT IN AN ORGANIZED HEALTH CARE EDUCATION/TRAINING PROGRAM

## 2025-07-13 PROCEDURE — 96365 THER/PROPH/DIAG IV INF INIT: CPT | Performed by: STUDENT IN AN ORGANIZED HEALTH CARE EDUCATION/TRAINING PROGRAM

## 2025-07-13 RX ORDER — POTASSIUM CHLORIDE 1500 MG/1
40 TABLET, EXTENDED RELEASE ORAL ONCE
Status: COMPLETED | OUTPATIENT
Start: 2025-07-13 | End: 2025-07-13

## 2025-07-13 RX ORDER — ONDANSETRON 2 MG/ML
4 INJECTION INTRAMUSCULAR; INTRAVENOUS EVERY 30 MIN PRN
Status: DISCONTINUED | OUTPATIENT
Start: 2025-07-13 | End: 2025-07-14 | Stop reason: HOSPADM

## 2025-07-13 RX ORDER — MAGNESIUM SULFATE HEPTAHYDRATE 40 MG/ML
2 INJECTION, SOLUTION INTRAVENOUS ONCE
Status: COMPLETED | OUTPATIENT
Start: 2025-07-13 | End: 2025-07-13

## 2025-07-13 RX ORDER — POTASSIUM CHLORIDE 7.45 MG/ML
10 INJECTION INTRAVENOUS ONCE
Status: COMPLETED | OUTPATIENT
Start: 2025-07-13 | End: 2025-07-13

## 2025-07-13 RX ADMIN — POTASSIUM CHLORIDE 40 MEQ: 1500 TABLET, EXTENDED RELEASE ORAL at 22:31

## 2025-07-13 RX ADMIN — POTASSIUM CHLORIDE 10 MEQ: 7.46 INJECTION, SOLUTION INTRAVENOUS at 22:39

## 2025-07-13 RX ADMIN — MAGNESIUM SULFATE HEPTAHYDRATE 2 G: 40 INJECTION, SOLUTION INTRAVENOUS at 22:39

## 2025-07-13 RX ADMIN — SODIUM CHLORIDE 1000 ML: 0.9 INJECTION, SOLUTION INTRAVENOUS at 21:11

## 2025-07-13 RX ADMIN — SODIUM CHLORIDE 1000 ML: 0.9 INJECTION, SOLUTION INTRAVENOUS at 23:14

## 2025-07-13 ASSESSMENT — COLUMBIA-SUICIDE SEVERITY RATING SCALE - C-SSRS
2. HAVE YOU ACTUALLY HAD ANY THOUGHTS OF KILLING YOURSELF IN THE PAST MONTH?: NO
6. HAVE YOU EVER DONE ANYTHING, STARTED TO DO ANYTHING, OR PREPARED TO DO ANYTHING TO END YOUR LIFE?: NO
1. IN THE PAST MONTH, HAVE YOU WISHED YOU WERE DEAD OR WISHED YOU COULD GO TO SLEEP AND NOT WAKE UP?: NO

## 2025-07-13 ASSESSMENT — ACTIVITIES OF DAILY LIVING (ADL)
ADLS_ACUITY_SCORE: 41

## 2025-07-14 ENCOUNTER — HOSPITAL ENCOUNTER (INPATIENT)
Facility: CLINIC | Age: 40
LOS: 2 days | Discharge: HOME | End: 2025-07-16
Attending: INTERNAL MEDICINE | Admitting: INTERNAL MEDICINE

## 2025-07-14 ENCOUNTER — APPOINTMENT (OUTPATIENT)
Dept: CT IMAGING | Facility: CLINIC | Age: 40
End: 2025-07-14
Attending: STUDENT IN AN ORGANIZED HEALTH CARE EDUCATION/TRAINING PROGRAM

## 2025-07-14 VITALS
SYSTOLIC BLOOD PRESSURE: 117 MMHG | DIASTOLIC BLOOD PRESSURE: 80 MMHG | OXYGEN SATURATION: 99 % | TEMPERATURE: 97.9 F | HEART RATE: 89 BPM | BODY MASS INDEX: 28.63 KG/M2 | RESPIRATION RATE: 21 BRPM | WEIGHT: 223 LBS

## 2025-07-14 DIAGNOSIS — F10.939 ALCOHOL WITHDRAWAL SYNDROME WITH COMPLICATION, WITH UNSPECIFIED COMPLICATION (H): Primary | ICD-10-CM

## 2025-07-14 DIAGNOSIS — R56.9 SEIZURE (H): ICD-10-CM

## 2025-07-14 PROBLEM — E87.29 HIGH ANION GAP METABOLIC ACIDOSIS: Status: ACTIVE | Noted: 2025-07-14

## 2025-07-14 PROBLEM — N17.9 ACUTE KIDNEY INJURY: Status: ACTIVE | Noted: 2025-07-14

## 2025-07-14 PROBLEM — E83.42 HYPOMAGNESEMIA: Status: ACTIVE | Noted: 2025-07-14

## 2025-07-14 PROBLEM — E87.6 ACUTE HYPOKALEMIA: Status: ACTIVE | Noted: 2025-07-14

## 2025-07-14 LAB
ALBUMIN UR-MCNC: 70 MG/DL
AMPHETAMINES UR QL SCN: ABNORMAL
ANION GAP SERPL CALCULATED.3IONS-SCNC: 17 MMOL/L (ref 7–15)
ANION GAP SERPL CALCULATED.3IONS-SCNC: 18 MMOL/L (ref 7–15)
APPEARANCE UR: ABNORMAL
BARBITURATES UR QL SCN: ABNORMAL
BENZODIAZ UR QL SCN: ABNORMAL
BILIRUB UR QL STRIP: ABNORMAL
BUN SERPL-MCNC: 15.7 MG/DL (ref 6–20)
BUN SERPL-MCNC: 16.6 MG/DL (ref 6–20)
BZE UR QL SCN: ABNORMAL
CALCIUM SERPL-MCNC: 9.2 MG/DL (ref 8.8–10.4)
CALCIUM SERPL-MCNC: 9.4 MG/DL (ref 8.8–10.4)
CANNABINOIDS UR QL SCN: ABNORMAL
CHLORIDE SERPL-SCNC: 96 MMOL/L (ref 98–107)
CHLORIDE SERPL-SCNC: 97 MMOL/L (ref 98–107)
COLOR UR AUTO: YELLOW
CREAT SERPL-MCNC: 1.02 MG/DL (ref 0.67–1.17)
CREAT SERPL-MCNC: 1.04 MG/DL (ref 0.67–1.17)
EGFRCR SERPLBLD CKD-EPI 2021: >90 ML/MIN/1.73M2
EGFRCR SERPLBLD CKD-EPI 2021: >90 ML/MIN/1.73M2
ERYTHROCYTE [DISTWIDTH] IN BLOOD BY AUTOMATED COUNT: 14 % (ref 10–15)
FENTANYL UR QL: ABNORMAL
GLUCOSE SERPL-MCNC: 106 MG/DL (ref 70–99)
GLUCOSE SERPL-MCNC: 123 MG/DL (ref 70–99)
GLUCOSE UR STRIP-MCNC: NEGATIVE MG/DL
HCO3 SERPL-SCNC: 23 MMOL/L (ref 22–29)
HCO3 SERPL-SCNC: 23 MMOL/L (ref 22–29)
HCT VFR BLD AUTO: 28.6 % (ref 40–53)
HGB BLD-MCNC: 9.9 G/DL (ref 13.3–17.7)
HGB UR QL STRIP: ABNORMAL
HYALINE CASTS: 18 /LPF
KETONES UR STRIP-MCNC: ABNORMAL MG/DL
LACTATE SERPL-SCNC: 1.4 MMOL/L (ref 0.7–2)
LEUKOCYTE ESTERASE UR QL STRIP: ABNORMAL
MAGNESIUM SERPL-MCNC: 1.6 MG/DL (ref 1.7–2.3)
MCH RBC QN AUTO: 35 PG (ref 26.5–33)
MCHC RBC AUTO-ENTMCNC: 34.6 G/DL (ref 31.5–36.5)
MCV RBC AUTO: 101 FL (ref 78–100)
MUCOUS THREADS #/AREA URNS LPF: PRESENT /LPF
NITRATE UR QL: NEGATIVE
OPIATES UR QL SCN: ABNORMAL
PCP QUAL URINE (ROCHE): ABNORMAL
PH UR STRIP: 5.5 [PH] (ref 5–7)
PHOSPHATE SERPL-MCNC: 3 MG/DL (ref 2.5–4.5)
PLATELET # BLD AUTO: 161 10E3/UL (ref 150–450)
POTASSIUM SERPL-SCNC: 3 MMOL/L (ref 3.4–5.3)
POTASSIUM SERPL-SCNC: 3 MMOL/L (ref 3.4–5.3)
POTASSIUM SERPL-SCNC: 3.7 MMOL/L (ref 3.4–5.3)
RBC # BLD AUTO: 2.83 10E6/UL (ref 4.4–5.9)
RBC URINE: 8 /HPF
SODIUM SERPL-SCNC: 137 MMOL/L (ref 135–145)
SODIUM SERPL-SCNC: 137 MMOL/L (ref 135–145)
SP GR UR STRIP: 1.02 (ref 1–1.03)
SQUAMOUS EPITHELIAL: 1 /HPF
UROBILINOGEN UR STRIP-MCNC: 12 MG/DL
WBC # BLD AUTO: 6.5 10E3/UL (ref 4–11)
WBC URINE: 23 /HPF

## 2025-07-14 PROCEDURE — 84100 ASSAY OF PHOSPHORUS: CPT | Performed by: INTERNAL MEDICINE

## 2025-07-14 PROCEDURE — 80048 BASIC METABOLIC PNL TOTAL CA: CPT | Performed by: INTERNAL MEDICINE

## 2025-07-14 PROCEDURE — 80307 DRUG TEST PRSMV CHEM ANLYZR: CPT | Performed by: STUDENT IN AN ORGANIZED HEALTH CARE EDUCATION/TRAINING PROGRAM

## 2025-07-14 PROCEDURE — 36415 COLL VENOUS BLD VENIPUNCTURE: CPT | Performed by: INTERNAL MEDICINE

## 2025-07-14 PROCEDURE — 36415 COLL VENOUS BLD VENIPUNCTURE: CPT | Performed by: STUDENT IN AN ORGANIZED HEALTH CARE EDUCATION/TRAINING PROGRAM

## 2025-07-14 PROCEDURE — 120N000004 HC R&B MS OVERFLOW

## 2025-07-14 PROCEDURE — 250N000011 HC RX IP 250 OP 636: Performed by: STUDENT IN AN ORGANIZED HEALTH CARE EDUCATION/TRAINING PROGRAM

## 2025-07-14 PROCEDURE — 70450 CT HEAD/BRAIN W/O DYE: CPT

## 2025-07-14 PROCEDURE — 99223 1ST HOSP IP/OBS HIGH 75: CPT | Mod: 95 | Performed by: INTERNAL MEDICINE

## 2025-07-14 PROCEDURE — 83735 ASSAY OF MAGNESIUM: CPT | Performed by: INTERNAL MEDICINE

## 2025-07-14 PROCEDURE — 250N000013 HC RX MED GY IP 250 OP 250 PS 637: Performed by: STUDENT IN AN ORGANIZED HEALTH CARE EDUCATION/TRAINING PROGRAM

## 2025-07-14 PROCEDURE — 250N000013 HC RX MED GY IP 250 OP 250 PS 637: Performed by: INTERNAL MEDICINE

## 2025-07-14 PROCEDURE — 96361 HYDRATE IV INFUSION ADD-ON: CPT | Performed by: STUDENT IN AN ORGANIZED HEALTH CARE EDUCATION/TRAINING PROGRAM

## 2025-07-14 PROCEDURE — 81003 URINALYSIS AUTO W/O SCOPE: CPT | Performed by: STUDENT IN AN ORGANIZED HEALTH CARE EDUCATION/TRAINING PROGRAM

## 2025-07-14 PROCEDURE — HZ2ZZZZ DETOXIFICATION SERVICES FOR SUBSTANCE ABUSE TREATMENT: ICD-10-PCS | Performed by: INTERNAL MEDICINE

## 2025-07-14 PROCEDURE — 250N000011 HC RX IP 250 OP 636: Performed by: INTERNAL MEDICINE

## 2025-07-14 PROCEDURE — 84132 ASSAY OF SERUM POTASSIUM: CPT | Performed by: INTERNAL MEDICINE

## 2025-07-14 PROCEDURE — 85027 COMPLETE CBC AUTOMATED: CPT | Performed by: INTERNAL MEDICINE

## 2025-07-14 PROCEDURE — 80048 BASIC METABOLIC PNL TOTAL CA: CPT | Performed by: STUDENT IN AN ORGANIZED HEALTH CARE EDUCATION/TRAINING PROGRAM

## 2025-07-14 PROCEDURE — 99207 PR NO BILLABLE SERVICE THIS VISIT: CPT | Performed by: INTERNAL MEDICINE

## 2025-07-14 PROCEDURE — 87086 URINE CULTURE/COLONY COUNT: CPT | Performed by: STUDENT IN AN ORGANIZED HEALTH CARE EDUCATION/TRAINING PROGRAM

## 2025-07-14 PROCEDURE — 83605 ASSAY OF LACTIC ACID: CPT | Performed by: STUDENT IN AN ORGANIZED HEALTH CARE EDUCATION/TRAINING PROGRAM

## 2025-07-14 PROCEDURE — 96375 TX/PRO/DX INJ NEW DRUG ADDON: CPT | Performed by: STUDENT IN AN ORGANIZED HEALTH CARE EDUCATION/TRAINING PROGRAM

## 2025-07-14 RX ORDER — OLANZAPINE 5 MG/1
5-10 TABLET, ORALLY DISINTEGRATING ORAL EVERY 6 HOURS PRN
Status: DISCONTINUED | OUTPATIENT
Start: 2025-07-14 | End: 2025-07-16 | Stop reason: HOSPADM

## 2025-07-14 RX ORDER — LIDOCAINE 40 MG/G
CREAM TOPICAL
Status: DISCONTINUED | OUTPATIENT
Start: 2025-07-14 | End: 2025-07-16 | Stop reason: HOSPADM

## 2025-07-14 RX ORDER — ONDANSETRON 2 MG/ML
4 INJECTION INTRAMUSCULAR; INTRAVENOUS EVERY 6 HOURS PRN
Status: DISCONTINUED | OUTPATIENT
Start: 2025-07-14 | End: 2025-07-16 | Stop reason: HOSPADM

## 2025-07-14 RX ORDER — AMOXICILLIN 250 MG
1 CAPSULE ORAL 2 TIMES DAILY PRN
Status: DISCONTINUED | OUTPATIENT
Start: 2025-07-14 | End: 2025-07-16 | Stop reason: HOSPADM

## 2025-07-14 RX ORDER — DIAZEPAM 5 MG/1
10 TABLET ORAL EVERY 30 MIN PRN
Status: DISCONTINUED | OUTPATIENT
Start: 2025-07-14 | End: 2025-07-16 | Stop reason: HOSPADM

## 2025-07-14 RX ORDER — GABAPENTIN 300 MG/1
300 CAPSULE ORAL EVERY 8 HOURS
Status: DISCONTINUED | OUTPATIENT
Start: 2025-07-19 | End: 2025-07-16 | Stop reason: HOSPADM

## 2025-07-14 RX ORDER — MAGNESIUM SULFATE HEPTAHYDRATE 40 MG/ML
2 INJECTION, SOLUTION INTRAVENOUS ONCE
Status: COMPLETED | OUTPATIENT
Start: 2025-07-14 | End: 2025-07-14

## 2025-07-14 RX ORDER — POTASSIUM CHLORIDE 1500 MG/1
20 TABLET, EXTENDED RELEASE ORAL ONCE
Status: COMPLETED | OUTPATIENT
Start: 2025-07-14 | End: 2025-07-14

## 2025-07-14 RX ORDER — HALOPERIDOL 5 MG/ML
2.5-5 INJECTION INTRAMUSCULAR EVERY 6 HOURS PRN
Status: DISCONTINUED | OUTPATIENT
Start: 2025-07-14 | End: 2025-07-16 | Stop reason: HOSPADM

## 2025-07-14 RX ORDER — GABAPENTIN 600 MG/1
1200 TABLET ORAL ONCE
Status: COMPLETED | OUTPATIENT
Start: 2025-07-14 | End: 2025-07-14

## 2025-07-14 RX ORDER — FOLIC ACID 5 MG/ML
1 INJECTION, SOLUTION INTRAMUSCULAR; INTRAVENOUS; SUBCUTANEOUS ONCE
Status: COMPLETED | OUTPATIENT
Start: 2025-07-14 | End: 2025-07-14

## 2025-07-14 RX ORDER — THIAMINE HYDROCHLORIDE 100 MG/ML
100 INJECTION, SOLUTION INTRAMUSCULAR; INTRAVENOUS ONCE
Status: COMPLETED | OUTPATIENT
Start: 2025-07-14 | End: 2025-07-14

## 2025-07-14 RX ORDER — CLONIDINE HYDROCHLORIDE 0.1 MG/1
0.1 TABLET ORAL EVERY 8 HOURS
Status: DISCONTINUED | OUTPATIENT
Start: 2025-07-14 | End: 2025-07-14

## 2025-07-14 RX ORDER — FLUMAZENIL 0.1 MG/ML
0.2 INJECTION, SOLUTION INTRAVENOUS
Status: DISCONTINUED | OUTPATIENT
Start: 2025-07-14 | End: 2025-07-16 | Stop reason: HOSPADM

## 2025-07-14 RX ORDER — ONDANSETRON 4 MG/1
4 TABLET, ORALLY DISINTEGRATING ORAL EVERY 6 HOURS PRN
Status: DISCONTINUED | OUTPATIENT
Start: 2025-07-14 | End: 2025-07-16 | Stop reason: HOSPADM

## 2025-07-14 RX ORDER — GABAPENTIN 300 MG/1
600 CAPSULE ORAL EVERY 8 HOURS
Status: DISCONTINUED | OUTPATIENT
Start: 2025-07-17 | End: 2025-07-16 | Stop reason: HOSPADM

## 2025-07-14 RX ORDER — MULTIPLE VITAMINS W/ MINERALS TAB 9MG-400MCG
1 TAB ORAL ONCE
Status: COMPLETED | OUTPATIENT
Start: 2025-07-14 | End: 2025-07-14

## 2025-07-14 RX ORDER — POTASSIUM CHLORIDE 1500 MG/1
40 TABLET, EXTENDED RELEASE ORAL ONCE
Status: COMPLETED | OUTPATIENT
Start: 2025-07-14 | End: 2025-07-14

## 2025-07-14 RX ORDER — THIAMINE HYDROCHLORIDE 100 MG/ML
100 INJECTION, SOLUTION INTRAMUSCULAR; INTRAVENOUS DAILY
Status: DISCONTINUED | OUTPATIENT
Start: 2025-07-14 | End: 2025-07-15

## 2025-07-14 RX ORDER — GABAPENTIN 100 MG/1
100 CAPSULE ORAL EVERY 8 HOURS
Status: DISCONTINUED | OUTPATIENT
Start: 2025-07-21 | End: 2025-07-16 | Stop reason: HOSPADM

## 2025-07-14 RX ORDER — DIAZEPAM 10 MG/2ML
5-10 INJECTION, SOLUTION INTRAMUSCULAR; INTRAVENOUS EVERY 30 MIN PRN
Status: DISCONTINUED | OUTPATIENT
Start: 2025-07-14 | End: 2025-07-16 | Stop reason: HOSPADM

## 2025-07-14 RX ORDER — CIPROFLOXACIN 500 MG/1
500 TABLET, FILM COATED ORAL EVERY 12 HOURS SCHEDULED
Status: DISCONTINUED | OUTPATIENT
Start: 2025-07-14 | End: 2025-07-16 | Stop reason: HOSPADM

## 2025-07-14 RX ORDER — AMOXICILLIN 250 MG
2 CAPSULE ORAL 2 TIMES DAILY PRN
Status: DISCONTINUED | OUTPATIENT
Start: 2025-07-14 | End: 2025-07-16 | Stop reason: HOSPADM

## 2025-07-14 RX ORDER — GABAPENTIN 300 MG/1
900 CAPSULE ORAL EVERY 8 HOURS
Status: DISCONTINUED | OUTPATIENT
Start: 2025-07-14 | End: 2025-07-16 | Stop reason: HOSPADM

## 2025-07-14 RX ORDER — CALCIUM CARBONATE 500 MG/1
1000 TABLET, CHEWABLE ORAL 4 TIMES DAILY PRN
Status: DISCONTINUED | OUTPATIENT
Start: 2025-07-14 | End: 2025-07-16 | Stop reason: HOSPADM

## 2025-07-14 RX ORDER — FOLIC ACID 5 MG/ML
1 INJECTION, SOLUTION INTRAMUSCULAR; INTRAVENOUS; SUBCUTANEOUS DAILY
Status: DISCONTINUED | OUTPATIENT
Start: 2025-07-14 | End: 2025-07-15

## 2025-07-14 RX ORDER — MULTIPLE VITAMINS W/ MINERALS TAB 9MG-400MCG
1 TAB ORAL DAILY
Status: DISCONTINUED | OUTPATIENT
Start: 2025-07-14 | End: 2025-07-16 | Stop reason: HOSPADM

## 2025-07-14 RX ADMIN — CIPROFLOXACIN 500 MG: 500 TABLET ORAL at 20:31

## 2025-07-14 RX ADMIN — MAGNESIUM SULFATE HEPTAHYDRATE 2 G: 40 INJECTION, SOLUTION INTRAVENOUS at 09:33

## 2025-07-14 RX ADMIN — FOLIC ACID 1 MG: 5 INJECTION, SOLUTION INTRAMUSCULAR; INTRAVENOUS; SUBCUTANEOUS at 08:06

## 2025-07-14 RX ADMIN — Medication 1 TABLET: at 02:59

## 2025-07-14 RX ADMIN — Medication 1 TABLET: at 08:02

## 2025-07-14 RX ADMIN — POTASSIUM CHLORIDE 20 MEQ: 1500 TABLET, EXTENDED RELEASE ORAL at 15:05

## 2025-07-14 RX ADMIN — PANTOPRAZOLE SODIUM 40 MG: 40 INJECTION, POWDER, LYOPHILIZED, FOR SOLUTION INTRAVENOUS at 08:01

## 2025-07-14 RX ADMIN — THIAMINE HYDROCHLORIDE 100 MG: 100 INJECTION, SOLUTION INTRAMUSCULAR; INTRAVENOUS at 08:01

## 2025-07-14 RX ADMIN — FOLIC ACID 1 MG: 5 INJECTION, SOLUTION INTRAMUSCULAR; INTRAVENOUS; SUBCUTANEOUS at 02:59

## 2025-07-14 RX ADMIN — CLONIDINE HYDROCHLORIDE 0.1 MG: 0.1 TABLET ORAL at 13:12

## 2025-07-14 RX ADMIN — CLONIDINE HYDROCHLORIDE 0.1 MG: 0.1 TABLET ORAL at 05:07

## 2025-07-14 RX ADMIN — GABAPENTIN 900 MG: 300 CAPSULE ORAL at 13:12

## 2025-07-14 RX ADMIN — THIAMINE HYDROCHLORIDE 100 MG: 100 INJECTION, SOLUTION INTRAMUSCULAR; INTRAVENOUS at 03:00

## 2025-07-14 RX ADMIN — DIAZEPAM 10 MG: 5 TABLET ORAL at 05:07

## 2025-07-14 RX ADMIN — POTASSIUM CHLORIDE 40 MEQ: 1500 TABLET, EXTENDED RELEASE ORAL at 13:12

## 2025-07-14 RX ADMIN — GABAPENTIN 1200 MG: 600 TABLET, FILM COATED ORAL at 05:07

## 2025-07-14 RX ADMIN — GABAPENTIN 900 MG: 300 CAPSULE ORAL at 20:31

## 2025-07-14 ASSESSMENT — ACTIVITIES OF DAILY LIVING (ADL)
ADLS_ACUITY_SCORE: 25
ADLS_ACUITY_SCORE: 18
ADLS_ACUITY_SCORE: 15
ADLS_ACUITY_SCORE: 25
ADLS_ACUITY_SCORE: 15
ADLS_ACUITY_SCORE: 25
ADLS_ACUITY_SCORE: 25
ADLS_ACUITY_SCORE: 15
ADLS_ACUITY_SCORE: 41
ADLS_ACUITY_SCORE: 15
ADLS_ACUITY_SCORE: 15
ADLS_ACUITY_SCORE: 18
ADLS_ACUITY_SCORE: 25
ADLS_ACUITY_SCORE: 15
ADLS_ACUITY_SCORE: 18
ADLS_ACUITY_SCORE: 25
ADLS_ACUITY_SCORE: 15
ADLS_ACUITY_SCORE: 41
ADLS_ACUITY_SCORE: 41
ADLS_ACUITY_SCORE: 15

## 2025-07-14 ASSESSMENT — LIFESTYLE VARIABLES
HEADACHE, FULLNESS IN HEAD: NOT PRESENT
TREMOR: 2
PAROXYSMAL SWEATS: BARELY PERCEPTIBLE SWEATING, PALMS MOIST
VISUAL DISTURBANCES: NOT PRESENT
NAUSEA AND VOMITING: NO NAUSEA AND NO VOMITING
ANXIETY: NO ANXIETY, AT EASE
TACTILE DISTURBANCES: VERY MILD ITCHING, PINS AND NEEDLES, BURNING OR NUMBNESS
ORIENTATION AND CLOUDING OF SENSORIUM: ORIENTED AND CAN DO SERIAL ADDITIONS
AGITATION: NORMAL ACTIVITY
AUDITORY DISTURBANCES: NOT PRESENT
TOTAL SCORE: 4

## 2025-07-14 ASSESSMENT — COLUMBIA-SUICIDE SEVERITY RATING SCALE - C-SSRS
6. HAVE YOU EVER DONE ANYTHING, STARTED TO DO ANYTHING, OR PREPARED TO DO ANYTHING TO END YOUR LIFE?: NO
1. IN THE PAST MONTH, HAVE YOU WISHED YOU WERE DEAD OR WISHED YOU COULD GO TO SLEEP AND NOT WAKE UP?: NO
2. HAVE YOU ACTUALLY HAD ANY THOUGHTS OF KILLING YOURSELF IN THE PAST MONTH?: NO
2. HAVE YOU ACTUALLY HAD ANY THOUGHTS OF KILLING YOURSELF SINCE LAST CONTACT?: NO
6. HAVE YOU EVER DONE ANYTHING, STARTED TO DO ANYTHING, OR PREPARED TO DO ANYTHING TO END YOUR LIFE?: NO

## 2025-07-14 NOTE — PLAN OF CARE
"  Problem: Adult Inpatient Plan of Care  Goal: Plan of Care Review  Description: The Plan of Care Review/Shift note should be completed every shift.  The Outcome Evaluation is a brief statement about your assessment that the patient is improving, declining, or no change.  This information will be displayed automatically on your shift  note.  Outcome: Progressing  Goal: Patient-Specific Goal (Individualized)  Description: You can add care plan individualizations to a care plan. Examples of Individualization might be:  \"Parent requests to be called daily at 9am for status\", \"I have a hard time hearing out of my right ear\", or \"Do not touch me to wake me up as it startles  me\".  Outcome: Progressing  Goal: Absence of Hospital-Acquired Illness or Injury  Outcome: Progressing  Intervention: Identify and Manage Fall Risk  Recent Flowsheet Documentation  Taken 7/14/2025 1622 by Lalita Garcia RN  Safety Promotion/Fall Prevention:   activity supervised   assistive device/personal items within reach   clutter free environment maintained   increased rounding and observation   increase visualization of patient   lighting adjusted   nonskid shoes/slippers when out of bed   patient and family education  Taken 7/14/2025 0933 by Lalita Garcia RN  Safety Promotion/Fall Prevention:   activity supervised   assistive device/personal items within reach   clutter free environment maintained   increased rounding and observation   increase visualization of patient   lighting adjusted   nonskid shoes/slippers when out of bed   patient and family education  Intervention: Prevent Skin Injury  Recent Flowsheet Documentation  Taken 7/14/2025 1622 by Lalita Garcia RN  Body Position: position changed independently  Taken 7/14/2025 0933 by Lalita Garcia RN  Body Position: position changed independently  Taken 7/14/2025 0806 by Lalita Garcia RN  Body Position: position changed independently  Goal: Optimal Comfort and " Wellbeing  Outcome: Progressing  Intervention: Provide Person-Centered Care  Recent Flowsheet Documentation  Taken 7/14/2025 1622 by Lalita Garcia RN  Trust Relationship/Rapport:   care explained   choices provided   questions answered   questions encouraged   reassurance provided   thoughts/feelings acknowledged  Taken 7/14/2025 0933 by Lalita Garcia RN  Trust Relationship/Rapport:   care explained   choices provided   questions answered   questions encouraged   reassurance provided   thoughts/feelings acknowledged  Goal: Readiness for Transition of Care  Outcome: Progressing     Problem: Risk for Delirium  Goal: Optimal Coping  Outcome: Progressing  Goal: Improved Behavioral Control  Outcome: Progressing  Intervention: Minimize Safety Risk  Recent Flowsheet Documentation  Taken 7/14/2025 1622 by Lalita Garcia RN  Trust Relationship/Rapport:   care explained   choices provided   questions answered   questions encouraged   reassurance provided   thoughts/feelings acknowledged  Taken 7/14/2025 0933 by Lalita Garcia RN  Trust Relationship/Rapport:   care explained   choices provided   questions answered   questions encouraged   reassurance provided   thoughts/feelings acknowledged  Goal: Improved Attention and Thought Clarity  Outcome: Progressing  Goal: Improved Sleep  Outcome: Progressing     Problem: Alcohol Withdrawal  Goal: Alcohol Withdrawal Symptom Control  Outcome: Progressing  Goal: Optimal Neurologic Function  Outcome: Progressing  Goal: Readiness for Change Identified  Outcome: Progressing   Goal Outcome Evaluation:

## 2025-07-14 NOTE — PLAN OF CARE
"Goal Outcome Evaluation:pt admitted from [facility]. Alert and oriented to situation. He state that he had visual and auditory hallucinations at home, none currently in the hospital. He states \"the itchy/scratchies\" all over but  worse on both feet. \"Dry heaves\" yesterday, intermittent nausea upon admit. Abdomen non-tender and soft. Pt agreed with plan of care and agrees with medication plan. Pt sleeping this am. No seizure activity noted.                            "

## 2025-07-14 NOTE — PROGRESS NOTES
End Of Shift Note     Situation: Hypokalemia, ETOH withdrawal      Plan: Potassium replaced this shift with recheck at 2000.  Continue with CIWA with low score this shift, no valium needed.           Neuro: Alert/ oriented x 4     Cardiac: NSR     Resp: RA     GI/: Continent of bowel and bladder, will use urinal at bedside at times.     MSK: SBA --> Independent      Skin: Intact     LDAs: PIV x 1

## 2025-07-14 NOTE — H&P
HOSPITALIST TELEMED ADMISSION H&P    Service Date : 7/14/2025  Dr. Julissa MALDONADO am located in [place], [place]  Roland Londono is located in [place] at [facility]     RNSola on Med/Surg unit is assisting me today with this patient.    chief complaint:: Seizure with alcohol withdrawal    History of Present Illness:  Roland Londono is a 40 year old male  alcohol use disorder, presents to the emergency room after he experienced a seizure,   Patient stated that he drinks about a liter of alcohol a day however yesterday was his last drink.  At home he was having chills, and shaking.  It was reported that he fell, and started frothing at the mouth and had what appeared to be a convulsion.  Prior to this the patient was having some visual hallucinations, and cold sweats.   He denied that he had any vomiting but said that he was having dry heaves.  He stated that he was lightheaded and dizzy upon standing.  He also complained of a sharp chest pain, that would come and go lasting only 30 seconds, and nonradiating,  not associated with any sweating, nausea or vomiting.    Emergency Room Course - in the emergency room,    The patient received 2 L of lactated Ringer's, potassium replacement, magnesium replacement, IV thiamine, IV folic acid, 2 g IV magnesium, Zofran,     EKG is remarkable for sinus tachycardia with frequent  PVCs noted no acute ST-T wave elevations or depressions were noted.      CT of the head without contrast is unremarkable for any acute findings.    Review of the  complete metabolic panel is significant for potassium of 2.9, chloride 89, carbon dioxide 16 and a BUN 1.48,   Glucose 161,with an anion gap of 32 and a magnesium of 1.1.  Total protein 8.5, , direct bilirubin 0.96, total bilirubin 1.6.     Review of the CBC is significant for hemoglobin of 11.1, hematocrit 31.9,  and a platelet 162   Lipase is unremarkable     Lactate 5.1, this  resolved after 2 L of LR repeat 1.4     Review of the VBG is significant with a pH of 7.41, a pCO2 38, PO2=34, O2 saturation 58%      Past Medical History  No past medical history on file.   Patient Active Problem List   Diagnosis    Other and unspecified alcohol dependence, continuous drinking behavior    Acute hypokalemia    Hypomagnesemia    Seizure (H)    Acute kidney injury    High anion gap metabolic acidosis    Alcohol withdrawal syndrome with complication, with unspecified complication (H)         Past Surgical History  No past surgical history on file.   Social History  Roland  reports that he has been smoking. He has a 5 pack-year smoking history. He does not have any smokeless tobacco history on file. He reports current alcohol use of about 15.0 standard drinks of alcohol per week. He reports current drug use. Drug: Marijuana.    Family History  Roland's family history includes Asthma in his daughter.    Home Medications  Current Outpatient Medications   Medication Instructions    ibuprofen (ADVIL/MOTRIN) 800 mg, Oral, 3 TIMES DAILY PRN    ondansetron (ZOFRAN ODT) 4-8 mg, Oral, EVERY 8 HOURS PRN       Allergies  No Known Allergies     10 pt ROS neg except as noted in HPI    Physical Exam:  I performed all aspects of the physical examination via Telemedicine associated with two way audio and video communication.    Vital Signs: Blood pressure 117/75, temperature 98.2  F (36.8  C), temperature source Oral, weight 97.5 kg (214 lb 15.2 oz), SpO2 98%.    Physical Exam    Gen:  Well-developed, well-nourished, in no acute distress, lying semi-supine in his hospital bed.  HEENT:  NC/AT,   hearing intact to voice  Resp:  No accessory muscle use, breath sounds clear; no wheezes no rales no rhonchi  Card:  No murmur, normal S1, S2   Abd:  Soft per RN exam, no TTP, non-distended, normoactive bowel sounds are present,  Skin: No rashes or skin breakdown.   Ext: No clubbing, cyanosis or edema was noted  Psych:  Not  anxious, not agitated   Neuro: no  asterixis      DATA:    I&O: No intake/output data recorded.     Labs:  I have personally reviewed the following studies:  Recent Labs   Lab 07/14/25  0321 07/13/25 2108   POTASSIUM 3.0* 2.9*   CHLORIDE 97* 89*   CO2 23 16*   BUN 15.7 17.2   ALBUMIN  --  4.8   ALKPHOS  --  80   AST  --  162*   ALT  --  40   LIPASE  --  10*      Recent Labs   Lab 07/13/25  2108   WBC 7.5   HGB 11.1*   HCT 31.9*             Imaging: ER imaging reviewed      Misc  DVT prophylaxis:  Pneumatic compression device  Code Status: Full code   Cardiac Monitoring: yes active cardiac telemetry  Chan: none  Lines:  peripheral IV  Diet:  regular      ASSESSMENT/PLAN:  40-year-old with alcohol use disorder withdrawal and a seizure, will be admitted for medical management and stabilization.      This patient's current admission to an acute care setting is essential for the treatment of   Alcohol use disorder withdrawal    The patient's recovery is dependent on the following treatments of   - CIWA  - IV Thiamine, Folic acid, MVI  -  case management consult  -  seizure precaution  -Correcting any electrolyte abnormalities  to stabilize this condition.       The patient is at risk of developing further complications of  status epilepticus, aspiration pneumonia if not treated in an acute care setting.     I expect the patient's hospital stay to require 2 - 4 days      Our patient will be admitted under the hospitalist services and further management to be based on patient's clinical progress.       #ACTIVE PROBLEM LIST:     Patient takes no prescribed medications at this time    Clinically Significant Risk Factors Present on Admission        # Hypokalemia: Lowest K = 2.9 mmol/L in last 2 days, will replace as needed   # Hypochloremia: Lowest Cl = 89 mmol/L in last 2 days, will monitor as appropriate    # Hypomagnesemia: Lowest Mg = 1.1 mg/dL in last 2 days, will replace as needed  # Anion Gap Metabolic  Acidosis: Highest Anion Gap = 32 mmol/L in last 2 days, will monitor and treat as appropriate                                       As the provider for the telehealth service, I attest that I introduced myself to the patient and provided my credentials. Based on review of the patient s chart and/or a discussion with members of the patient s treatment team, I determined that telemedicine via a real-time, two-way, interactive audio and video platform is an appropriate means of providing this service.     The encounter was approximately 10 minutes. The nurse was present for the duration of the encounter.    Chart reviewed,labs and available imaging reviewed,consultant notes reviewed. Previous available medical records have been reviewed  Care plan discussed with care team    I have seen and examined the patient today. Documentation for this visit was completed using a template. Everything documented was personally performed today with the necessary additions, deletions and changes made as appropriate with the diagnoses being listed in no particular order of clinical relevance.    [facility]  Securely message with the Vocera Web Console (learn more here)  Text page via [employer] Paging/Directory

## 2025-07-14 NOTE — MEDICATION SCRIBE - ADMISSION MEDICATION HISTORY
Medication Scribe Admission Medication History    Admission medication history is complete. The information provided in this note is only as accurate as the sources available at the time of the update.    Information Source(s): Patient and CareEverywhere/SureScripts via in-person    Pertinent Information: PTA med list reviewed with patient in room and finished at desk.    He states that he takes no vitamins or supplements at home.    He said he did take Gabapentin bid that was not his own supply for 2 days before coming into the ER.    Changes made to PTA medication list:  Added: None  Deleted:   Ibuprofen 800 mg  Ondansetron 4 mg ODT    Changed: None    Allergies reviewed with patient and updates made in EHR: yes, no allergies.    Medication History Completed By: Jessica Hernandez 7/14/2025 6:41 PM    No outpatient medications have been marked as taking for the 7/14/25 encounter (Hospital Encounter).

## 2025-07-14 NOTE — PROGRESS NOTES
[facility] [facility] ADMISSION NOTE    Patient admitted to room ICU 5 at approximately 0400 via cart from EMS- transfer from [facility]. Patient was accompanied by other:2 EMS .     Verbal SBAR report received from Johanne CLOUD prior to patient arrival.     Patient ambulated to bed with stand-by assist. Patient alert and oriented X 3. Pain is controlled without any medications.  . Admission vital signs: Blood pressure 119/89, temperature 98.2  F (36.8  C), temperature source Oral, resp. rate 16, weight 97.5 kg (214 lb 15.2 oz), SpO2 98%. Patient was oriented to plan of care, call light, bed controls, telephone, bathroom, and visiting hours.     Risk Assessment    The following safety risks were identified during admission: fall and seizure. Yellow risk band applied: YES.     Skin Initial Assessment    This writer admitted this patient and completed a full skin assessment and Fidencio score in the Adult PCS flowsheet.   Photo documentation of skin problem and/or wound competed via [employer] application (located under Media):  N/A    Appropriate interventions initiated as needed.     Secondary skin check completed by pt declined skin check , keeping his clothes on. Denies issues..         Education    Patient has a [place] to Observation order: No  Observation education completed and documented: N/A      Sola Topete RN

## 2025-07-14 NOTE — CONSULTS
Care Management Note:    Received referral for chemical health issues, however, no documentation on discussion with pt about cessation of use.    CM team will not initiate conversation with pt about SUDS use until MD team has appropriate conversation.    SW sent secure message to MD to discuss with pt and if pt is interested in SUDS cessation, to please place an INPT CHEM DEP order.    Care Management team will attend IDT rounds and if pt he pt engages with the MD and team re: SUDS cessation, CM team will assist as needed.    No further care management intervention anticipated at this time.  Please re-consult if further needs arise.  Care management signing off.      TAISHA De Santiago

## 2025-07-14 NOTE — ED TRIAGE NOTES
"Patient presents to ED via ambulance with concerns for shaking/tachycardia. Patient was with some friends standing outside when they were concerned for him as he started \"shaking really bad\". Per EMS patient tachycardic and  diaphoretic. Patient denies any drug use today. States usually drinks at least a pint of alcohol a day- reports last drink was last night. Currently denies any symptoms.      Triage Assessment (Adult)       Row Name 07/13/25 2046          Triage Assessment    Airway WDL WDL        Respiratory WDL    Respiratory WDL WDL        Skin Circulation/Temperature WDL    Skin Circulation/Temperature WDL X;all;temperature;circulation     Skin Temperature warm        Cardiac WDL    Cardiac Rhythm ST                     " [id number] [facility]. [address] [place] [address], [place] [address]. All rights reserved. This information is not intended as a substitute for professional medical care. Always  follow your healthcare professional's instructions.

## 2025-07-14 NOTE — PROGRESS NOTES
[facility] Medicine Progress Note  Date of Service: 07/15/2025    Assessment & Plan   Roland Londono is a 40 year old male with h/o alcohol dependence without h/o severe withdrawal who presented on 7/13/2025 to [facility] ED with episode of shaking associated with loss of consciousness and later transferred on 7/14/2025 to [facility] for bed availability for treatment of suspected alcohol withdrawal seizure and alcohol withdrawal.     Suspect alcohol withdrawal seizure  Alcohol withdrawal  Alcohol dependence    H/o heavy alcohol use, up to 1L vodka daily, and had decided to wean himself off starting a week PTA. He had weaned down to wine coolers on day of admission. No h/o DTs or prior seizure. Had been hallucinating prior to witnessed onset of shaking with loss of responsiveness and foaming at the mouth lasting about a minute per S.O. Head CT normal except for some mild generalized volume loss. BAL neg.     Tremulous on admission but normal mentation. Started on gabapentin protocol and symptom-triggered diazepam per CIWA. Received 1 dose of diazepam this AM 7/14. Appears to be improving.   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 The gabapentin seems to be helping with this.   - continue gabapentin    Elevated lactic acid    In ED, LA 5.1 with AG 32 and serum bicarb 16.  Normal VpH 7.41. No evidence of infection and doubt sepsis. Likely due to seizure and dehydration.     Resolved with IV fluids.     Hypokalemia, moderate    K 2.7 on arrival. Likely due to chronic alcohol use and increased adrenergic tone on admission. Placed on replacement protocol.     K 3.0   - continue replacement protocol    Hypomagnesemia    Mg 1.1 on arrival. Given magnesium sulfate bolus in ED.     Mg 1.6 on recheck.   - 2g magnesium  "sulfate IV today      Acute kidney injury     Presented to ED with creatinine 1.48, baseline 0.81. Likely due to dehydration.     Resolving, creatinine 1.04    Clinically Significant Risk Factors Present on Admission        # Hypokalemia: Lowest K = 2.9 mmol/L in last 2 days, will replace as needed   # Hypochloremia: Lowest Cl = 89 mmol/L in last 2 days, will monitor as appropriate    # Hypomagnesemia: Lowest Mg = 1.1 mg/dL in last 2 days, will replace as needed  # Anion Gap Metabolic Acidosis: Highest Anion Gap = 32 mmol/L in last 2 days, will monitor and treat as appropriate           # Anemia: based on hgb <11       # Overweight: Estimated body mass index is 27.6 kg/m  as calculated from the following:    Height as of this encounter: 1.88 m (6' 2\").    Weight as of this encounter: 97.5 kg (214 lb 15.2 oz).              Diet: Combination Diet Regular Diet Adult, Regular Diet    DVT Prophylaxis: Low Risk/Ambulatory with no VTE prophylaxis indicated  Chan Catheter: Not present  Lines: None     Cardiac Monitoring: ACTIVE order. Indication: Electrolyte Imbalance (24 hours)- Magnesium <1.3 mg/ml; Potassium < =2.8 or > 5.5 mg/ml  Code Status: Full Code      Discussion/Disposition: Moderate withdrawal symptoms. May be able to discharge tomorrow if stable to improving.     Medically Ready for Discharge:          Medical Decision Making       45 MINUTES SPENT BY ME on the date of service doing chart review, history, exam, documentation & further activities per the note.        Chandana Anderson MD  [facility]  Securely message with [employer] (more info)  Text page via [employer] Paging/Directory     Interval History   Heavy alcohol use but was weaning down over past week and wants to quit. C/o bilateral feet burning and tingling discomfort that he previously treated with alcohol and now finds gabapentin is helping. No hallucinations today. No h/o severe withdrawal, just would have " nausea and dry heaves when quitting alcohol.     Physical Exam   Temp:  [97.4  F (36.3  C)-98.4  F (36.9  C)] 98.4  F (36.9  C)  Pulse:  [] 83  Resp:  [16-23] 18  BP: ()/(60-89) 96/60  SpO2:  [95 %-100 %] 98 %    Weights:   Vitals:    07/14/25 0414   Weight: 97.5 kg (214 lb 15.2 oz)    Body mass index is 27.6 kg/m .    Constitutional: alert and oriented, NAD, nonotoxic  CV: Regular, no edema  Respiratory: CTA bilaterally  GI: Soft, non-tender, bowel sounds normal   Skin: Warm and dry  Neuro: mild-moderate tremulousness, normal mentation    Data   Recent Labs   Lab 07/14/25  0659 07/14/25  0321 07/13/25  2108   WBC 6.5  --  7.5   HGB 9.9*  --  11.1*   *  --  103*     --  162    137 137   POTASSIUM 3.0* 3.0* 2.9*   CHLORIDE 96* 97* 89*   CO2 23 23 16*   BUN 16.6 15.7 17.2   CR 1.02 1.04 1.48*   ANIONGAP 18* 17* 32*   MAURICE 9.2 9.4 10.4   * 123* 161*   ALBUMIN  --   --  4.8   PROTTOTAL  --   --  8.5*   BILITOTAL  --   --  1.6*   ALKPHOS  --   --  80   ALT  --   --  40   AST  --   --  162*   LIPASE  --   --  10*       Recent Labs   Lab 07/14/25  0659 07/14/25  0321 07/13/25  2108   * 123* 161*        Unresulted Labs Ordered in the Past 30 Days of this Admission       Date and Time Order Name Status Description    7/14/2025  1:36 AM Urine Culture In process              Imaging:   Recent Results (from the past 24 hours)   CT Head w/o Contrast    Narrative    EXAM: CT HEAD W/O CONTRAST  LOCATION: [facility]  DATE: 7/14/2025    INDICATION: new seizure  COMPARISON: None.  TECHNIQUE: Routine CT Head without IV contrast. Multiplanar reformats. Dose reduction techniques were used.    FINDINGS:  INTRACRANIAL CONTENTS: No intracranial hemorrhage, extraaxial collection, or mass effect.  No CT evidence of acute infarct. Normal parenchymal attenuation. Mild generalized volume loss. No hydrocephalus.     VISUALIZED ORBITS/SINUSES/MASTOIDS: No intraorbital  abnormality. Mild mucosal thickening scattered about the paranasal sinuses. No middle ear or mastoid effusion.    BONES/SOFT TISSUES: No acute abnormality. Old fracture medial wall right orbit.  Obit.      Impression    IMPRESSION:  No acute intracranial process.          I reviewed all new labs and imaging results over the last 24 hours. I personally reviewed no images or EKG's today.    Medications   Current Facility-Administered Medications   Medication Dose Route Frequency Provider Last Rate Last Admin     Current Facility-Administered Medications   Medication Dose Route Frequency Provider Last Rate Last Admin    ciprofloxacin (CIPRO) tablet 500 mg  500 mg Oral Q12H [facility] (08/20) Chandana Anderson MD        folic acid injection 1 mg  1 mg Intravenous Daily Julissa Calvo MD   1 mg at 07/14/25 0806    [START ON 7/21/2025] gabapentin (NEURONTIN) capsule 100 mg  100 mg Oral Q8H Julissa Calvo MD        [START ON 7/19/2025] gabapentin (NEURONTIN) capsule 300 mg  300 mg Oral Q8H Julissa Calvo MD        [START ON 7/17/2025] gabapentin (NEURONTIN) capsule 600 mg  600 mg Oral Q8H Julissa Calvo MD        gabapentin (NEURONTIN) capsule 900 mg  900 mg Oral Q8H Julissa Calvo MD   900 mg at 07/14/25 1312    multivitamin w/minerals (THERA-VIT-M) tablet 1 tablet  1 tablet Oral Daily Julissa Calvo MD   1 tablet at 07/14/25 0802    pantoprazole (PROTONIX) injection 40 mg  40 mg Intravenous Daily with breakfast Julissa Calvo MD   40 mg at 07/14/25 0801    sodium chloride (PF) 0.9% PF flush 3 mL  3 mL Intracatheter Q8H JULIETTE Julissa Calvo MD   3 mL at 07/14/25 1313    thiamine (B-1) injection 100 mg  100 mg Intravenous Daily Julissa Calov MD   100 mg at 07/14/25 0801       Chandana Anderson MD  [facility] Medicine

## 2025-07-14 NOTE — ED PROVIDER NOTES
EMERGENCY DEPARTMENT ENCOUNTER      NAME: Roland Londono  AGE: 40 year old male  YOB: 1985  MRN: [id number]  EVALUATION DATE & TIME: 7/13/2025  8:42 PM    PCP: No Ref-Primary, Physician    ED PROVIDER: Ciro Esteban MD      Chief Complaint   Patient presents with    Tachycardia    Drug / Alcohol Assessment         FINAL IMPRESSION:  1. Alcohol withdrawal seizure without complication (H)    2. Hypomagnesemia    3. Hypokalemia    4. JOE (acute kidney injury)    5. High anion gap metabolic acidosis    6. Microscopic hematuria          ED COURSE & MEDICAL DECISION MAKING:    Pertinent Labs & Imaging studies reviewed. (See chart for details)  40 year old male presents to the Emergency Department for evaluation of tachycardia, EtOH    ED Course as of 07/14/25 0358   Sun Jul 13, 2025 2219 Patient is a 40-year-old male who presents to the emergency department with shakiness.  The patient drinks approximately 1 L of alcohol daily, last drink yesterday, trying to wean himself off, today had a period of time where he was shaking, altered, lost consciousness, began foaming at the mouth and had full body shakes, and then woke up in the ambulance.  This is likely alcohol withdrawal seizure.  He is feeling back to normal, has no tremulousness, diaphoresis, nausea, abdominal pain, anxiety.  He is mildly tachycardic.  His lab work is notable for a high anion gap metabolic acidosis with a bicarbonate of 16 and anion gap of 32.  He also has an JOE with a creatinine of 1.48.  He is hypokalemic at 2.9, which we will replace.  He is hypomagnesemic at 1.1, which we will replace.  He has a slight elevation in total bilirubin at 1.6 with a slight elevation in AST at 162.  Direct bilirubin is 0.96.  These are likely sequelae of his alcohol abuse.  His lipase is 10.  He may have a component of chronic pancreatitis.  No leukocytosis or significant anemia.  Given the severity of his anion gap elevation, will obtain lactic  acid, but I do suspect that this is related to dehydration, alcoholic ketosis, and seizure.  Will obtain urinalysis to assess for infection, UDS to assess for other intoxicating factors.  His alcohol level is undetectable.  Will obtain head CT given new onset seizure.  Will continue with fluid resuscitation.   2315 Lactic acid elevated at 5.1, which I suspect is due to seizure activity and dehydration, will repeat after fluids, and at this time without any infectious symptoms or signs on exam I have a lower suspicion of infection and will not start antibiotics at this time until further workup is completed   Mon Jul 14, 2025   0111 CT head negative for acute intracranial pathology.   0123 Lactic acid is resolved to 1.4.   0146 Urinalysis does not show evidence of infection but there is microscopic hematuria.   0200 UDS positive for amphetamine     3:58 AM patient's anion gap nearly closed and no longer acidotic on repeat BMP. Patient transferred to [facility].    Medical Decision Making  I obtained history from [place]  Care impacted by Alcohol and/or Drug Abuse or Dependence  I independently interpreted the CT head and note no acute intracranial pathology. See radiology report for final interpretation.  I discussed the care with another health care provider: hospitalist at [facility]  Transfer    MIPS (CTPE, Dental pain, Chan, Sinusitis, Asthma/COPD, Head Trauma): Not Applicable    SEPSIS: Lactic acid elevated due to dehydration / seizure. At this time there are no signs of sepsis or septic shock            At the conclusion of the encounter I discussed the results of all of the tests and the disposition. The questions were answered. The patient or family acknowledged understanding and was agreeable with the care plan.     0 minutes of critical care time     MEDICATIONS GIVEN IN THE EMERGENCY:  Medications   ondansetron (ZOFRAN) injection 4 mg (has no administration in time range)   sodium chloride 0.9% BOLUS 1,000 mL (0 mLs  "Intravenous Stopped 7/13/25 2300)   magnesium sulfate 2 g in 50 mL sterile water intermittent infusion (0 g Intravenous Stopped 7/13/25 2338)   potassium chloride 10 mEq in 100 mL sterile water infusion (0 mEq Intravenous Stopped 7/13/25 2338)   potassium chloride fortunato ER (KLOR-CON M20) CR tablet 40 mEq (40 mEq Oral $Given 7/13/25 2231)   sodium chloride 0.9% BOLUS 1,000 mL (0 mLs Intravenous Stopped 7/14/25 0058)   multivitamin w/minerals (THERA-VIT-M) tablet 1 tablet (1 tablet Oral $Given 7/14/25 0259)   thiamine (B-1) injection 100 mg (100 mg Intravenous $Given 7/14/25 0300)   folic acid injection 1 mg (1 mg Intravenous $Given 7/14/25 0259)       NEW PRESCRIPTIONS STARTED AT TODAY'S ER VISIT  Discharge Medication List as of 7/14/2025  3:36 AM             =================================================================    HPI    Patient information was obtained from: patient & fiance    Use of : N/A         Roland Londono is a 40 year old male with a pertinent history of alcohol dependence, smoker,  who presents to this ED by EMS for evaluation of shaking    Per fiance, she reports that the patient suddenly started \"shaking like a leaf,\" sweating profusely and had a blank stare, as if he was looking right through her this evening. She then said he was not responding and was slumped over in the car and was foaming at the mouth while still shaking. This incident occurred for roughly 1 minute. The patient denies history of withdrawal seizures from alcohol. He denies having delirium tremens, but does say tonight he thinks he may have been hallucinating. No use of rubbing alcohol or moonshine.     Of note, she reports that the patient regularly drinks alcohol, up to one liter per day. She said he did not have any today and has been trying to wean off it.       PAST MEDICAL HISTORY:  History reviewed. No pertinent past medical history.    PAST SURGICAL HISTORY:  History reviewed. No pertinent surgical " history.        CURRENT MEDICATIONS:    ibuprofen (ADVIL,MOTRIN) 800 MG tablet  ondansetron (ZOFRAN ODT) 4 MG disintegrating tablet        ALLERGIES:  No Known Allergies    FAMILY HISTORY:  Family History   Problem Relation Age of Onset    Asthma Daughter        SOCIAL HISTORY:   Social History     Socioeconomic History    Marital status: Single     Spouse name: None    Number of children: None    Years of education: None    Highest education level: None   Tobacco Use    Smoking status: Every Day     Current packs/day: 0.50     Average packs/day: 0.5 packs/day for 10.0 years (5.0 ttl pk-yrs)     Types: Cigarettes   Substance and Sexual Activity    Alcohol use: Yes     Alcohol/week: 15.0 standard drinks of alcohol    Drug use: Yes     Types: Marijuana    Sexual activity: Yes     Partners: Female   Social History Narrative    ** Merged History Encounter **            VITALS:  /80   Pulse 89   Temp 97.9  F (36.6  C) (Oral)   Resp 21   Wt 101.2 kg (223 lb)   SpO2 99%   BMI 28.63 kg/m      PHYSICAL EXAM    Physical Exam  Vitals and nursing note reviewed.   Constitutional:       General: He is not in acute distress.     Appearance: Normal appearance. He is normal weight. He is not ill-appearing or diaphoretic.   HENT:      Head: Normocephalic and atraumatic.      Nose: Nose normal.      Mouth/Throat:      Mouth: Mucous membranes are moist.      Pharynx: Oropharynx is clear.   Eyes:      Extraocular Movements: Extraocular movements intact.      Conjunctiva/sclera: Conjunctivae normal.      Pupils: Pupils are equal, round, and reactive to light.   Cardiovascular:      Rate and Rhythm: Regular rhythm. Tachycardia present.      Pulses: Normal pulses.      Heart sounds: Normal heart sounds. No murmur heard.  Pulmonary:      Effort: Pulmonary effort is normal. No respiratory distress.      Breath sounds: Normal breath sounds.   Abdominal:      General: Abdomen is flat. There is no distension.      Palpations:  Abdomen is soft.      Tenderness: There is no abdominal tenderness.   Musculoskeletal:         General: Normal range of motion.      Cervical back: Normal range of motion.      Right lower leg: No edema.      Left lower leg: No edema.   Skin:     General: Skin is warm and dry.      Capillary Refill: Capillary refill takes less than 2 seconds.      Coloration: Skin is not jaundiced.   Neurological:      General: No focal deficit present.      Mental Status: He is alert and oriented to person, place, and time. Mental status is at baseline.   Psychiatric:         Mood and Affect: Mood normal.         Behavior: Behavior normal.         Thought Content: Thought content normal.         Judgment: Judgment normal.            LAB:  All pertinent labs reviewed and interpreted.  Results for orders placed or performed during the hospital encounter of 07/13/25   CT Head w/o Contrast    Impression    IMPRESSION:  No acute intracranial process.     Basic metabolic panel   Result Value Ref Range    Sodium 137 135 - 145 mmol/L    Potassium 2.9 (L) 3.4 - 5.3 mmol/L    Chloride 89 (L) 98 - 107 mmol/L    Carbon Dioxide (CO2) 16 (L) 22 - 29 mmol/L    Anion Gap 32 (H) 7 - 15 mmol/L    Urea Nitrogen 17.2 6.0 - 20.0 mg/dL    Creatinine 1.48 (H) 0.67 - 1.17 mg/dL    GFR Estimate 62 >60 mL/min/1.73m2    Calcium 10.4 8.8 - 10.4 mg/dL    Glucose 161 (H) 70 - 99 mg/dL   Hepatic function panel   Result Value Ref Range    Protein Total 8.5 (H) 6.4 - 8.3 g/dL    Albumin 4.8 3.5 - 5.2 g/dL    Bilirubin Total 1.6 (H) <=1.2 mg/dL    Alkaline Phosphatase 80 40 - 150 U/L     (H) 0 - 45 U/L    ALT 40 0 - 70 U/L    Bilirubin Direct 0.96 (H) 0.00 - 0.45 mg/dL   Result Value Ref Range    Lipase 10 (L) 13 - 60 U/L   Result Value Ref Range    Magnesium 1.1 (L) 1.7 - 2.3 mg/dL   Result Value Ref Range    Ethanol Level Blood <0.01 <=0.01 g/dL   CBC with platelets and differential   Result Value Ref Range    WBC Count 7.5 4.0 - 11.0 10e3/uL    RBC Count  3.11 (L) 4.40 - 5.90 10e6/uL    Hemoglobin 11.1 (L) 13.3 - 17.7 g/dL    Hematocrit 31.9 (L) 40.0 - 53.0 %     (H) 78 - 100 fL    MCH 35.7 (H) 26.5 - 33.0 pg    MCHC 34.8 31.5 - 36.5 g/dL    RDW 14.4 10.0 - 15.0 %    Platelet Count 162 150 - 450 10e3/uL    % Neutrophils 67 %    % Lymphocytes 19 %    % Monocytes 12 %    % Eosinophils 0 %    % Basophils 0 %    % Immature Granulocytes 1 %    NRBCs per 100 WBC 0 <1 /100    Absolute Neutrophils 5.0 1.6 - 8.3 10e3/uL    Absolute Lymphocytes 1.4 0.8 - 5.3 10e3/uL    Absolute Monocytes 0.9 0.0 - 1.3 10e3/uL    Absolute Eosinophils 0.0 0.0 - 0.7 10e3/uL    Absolute Basophils 0.0 0.0 - 0.2 10e3/uL    Absolute Immature Granulocytes 0.1 <=0.4 10e3/uL    Absolute NRBCs 0.0 10e3/uL   Blood gas venous   Result Value Ref Range    pH Venous 7.41 7.32 - 7.43    pCO2 Venous 38 (L) 40 - 50 mm Hg    pO2 Venous 34 25 - 47 mm Hg    Bicarbonate Venous 24 21 - 28 mmol/L    Base Excess/Deficit Venous -0.8 -3.0 - 3.0 mmol/L    FIO2 0     Oxyhemoglobin Venous 56 (L) 70 - 75 %    O2 Sat, Venous 58.3 (L) 70.0 - 75.0 %   Lactic Acid Whole Blood with 1X Repeat in 2 HR when >2   Result Value Ref Range    Lactic Acid, Initial 5.1 (HH) 0.7 - 2.0 mmol/L   UA with Microscopic reflex to Culture    Specimen: Urine, Clean Catch   Result Value Ref Range    Color Urine Yellow Colorless, Straw, Light Yellow, Yellow    Appearance Urine Turbid (A) Clear    Glucose Urine Negative Negative mg/dL    Bilirubin Urine 0.5 mg/dL (A) Negative    Ketones Urine Trace (A) Negative mg/dL    Specific Gravity Urine 1.019 1.001 - 1.030    Blood Urine 0.1 mg/dL (A) Negative    pH Urine 5.5 5.0 - 7.0    Protein Albumin Urine 70 (A) Negative mg/dL    Urobilinogen Urine 12.0 (A) Normal mg/dL    Nitrite Urine Negative Negative    Leukocyte Esterase Urine 75 [place]le/uL (A) Negative    Mucus Urine Present (A) None Seen /LPF    RBC Urine 8 (H) <=2 /HPF    WBC Urine 23 (H) <=5 /HPF    Squamous Epithelials Urine 1 <=1 /HPF     Hyaline Casts Urine 18 (H) <=2 /LPF   Lactic acid whole blood   Result Value Ref Range    Lactic Acid 1.4 0.7 - 2.0 mmol/L   Urine Drug Screen Panel   Result Value Ref Range    Amphetamines Urine Screen Positive (A) Screen Negative    Barbituates Urine Screen Negative Screen Negative    Benzodiazepine Urine Screen Negative Screen Negative    Cannabinoids Urine Screen Negative Screen Negative    Cocaine Urine Screen Negative Screen Negative    Fentanyl Qual Urine Screen Negative Screen Negative    Opiates Urine Screen Negative Screen Negative    PCP Urine Screen Negative Screen Negative   Basic metabolic panel   Result Value Ref Range    Sodium 137 135 - 145 mmol/L    Potassium 3.0 (L) 3.4 - 5.3 mmol/L    Chloride 97 (L) 98 - 107 mmol/L    Carbon Dioxide (CO2) 23 22 - 29 mmol/L    Anion Gap 17 (H) 7 - 15 mmol/L    Urea Nitrogen 15.7 6.0 - 20.0 mg/dL    Creatinine 1.04 0.67 - 1.17 mg/dL    GFR Estimate >90 >60 mL/min/1.73m2    Calcium 9.4 8.8 - 10.4 mg/dL    Glucose 123 (H) 70 - 99 mg/dL   ECG 12-LEAD WITH MUSE (LHE)   Result Value Ref Range    Systolic Blood Pressure 123 mmHg    Diastolic Blood Pressure 80 mmHg    Ventricular Rate 114 BPM    Atrial Rate 114 BPM    NJ Interval 128 ms    QRS Duration 100 ms     ms    QTc 471 ms    P Axis 47 degrees    R AXIS 48 degrees    T Axis 63 degrees    Interpretation ECG       Sinus tachycardia with frequent Premature ventricular complexes  Nonspecific ST and T wave abnormality  Abnormal ECG  When compared with ECG of 07-Feb-2022 13:44,  ST now depressed in Inferior leads  ST now depressed in Anterolateral leads  T wave inversion no longer evident in Inferior leads  Nonspecific T wave abnormality, improved in Anterior leads  Nonspecific T wave abnormality, worse in Lateral leads  Confirmed by SEE ED PROVIDER NOTE FOR, ECG INTERPRETATION (4000),  DONNY CHUA ([id number]) on 7/13/2025 9:54:20 PM         RADIOLOGY:  Reviewed all pertinent imaging. Please see official  radiology report.  CT Head w/o Contrast   Final Result   IMPRESSION:   No acute intracranial process.             PROCEDURES:   N/A                   I, Guerline Lindsey, am serving as a scribe to document services personally performed by Ciro Esteban MD based on my observation and the provider's statements to me. I, Ciro Esteban MD, attest that Guerline Lindsey is acting in a scribe capacity, has observed my performance of the services and has documented them in accordance with my direction.    Ciro Esteban MD  [facility] EMERGENCY ROOM  [address]] [phone]       Ciro Esteban MD  07/14/25 0350

## 2025-07-15 LAB
ALBUMIN SERPL BCG-MCNC: 4 G/DL (ref 3.5–5.2)
ALP SERPL-CCNC: 72 U/L (ref 40–150)
ALT SERPL W P-5'-P-CCNC: 32 U/L (ref 0–70)
ANION GAP SERPL CALCULATED.3IONS-SCNC: 15 MMOL/L (ref 7–15)
AST SERPL W P-5'-P-CCNC: 104 U/L (ref 0–45)
BACTERIA UR CULT: NO GROWTH
BILIRUB SERPL-MCNC: 0.8 MG/DL
BUN SERPL-MCNC: 16.9 MG/DL (ref 6–20)
CALCIUM SERPL-MCNC: 8.9 MG/DL (ref 8.8–10.4)
CHLORIDE SERPL-SCNC: 101 MMOL/L (ref 98–107)
CK SERPL-CCNC: 476 U/L (ref 39–308)
CREAT SERPL-MCNC: 1.01 MG/DL (ref 0.67–1.17)
EGFRCR SERPLBLD CKD-EPI 2021: >90 ML/MIN/1.73M2
ERYTHROCYTE [DISTWIDTH] IN BLOOD BY AUTOMATED COUNT: 14.7 % (ref 10–15)
GLUCOSE SERPL-MCNC: 99 MG/DL (ref 70–99)
HCO3 SERPL-SCNC: 23 MMOL/L (ref 22–29)
HCT VFR BLD AUTO: 28.7 % (ref 40–53)
HGB BLD-MCNC: 9.6 G/DL (ref 13.3–17.7)
MAGNESIUM SERPL-MCNC: 1.3 MG/DL (ref 1.7–2.3)
MCH RBC QN AUTO: 34.8 PG (ref 26.5–33)
MCHC RBC AUTO-ENTMCNC: 33.4 G/DL (ref 31.5–36.5)
MCV RBC AUTO: 104 FL (ref 78–100)
PLATELET # BLD AUTO: 218 10E3/UL (ref 150–450)
POTASSIUM SERPL-SCNC: 3.2 MMOL/L (ref 3.4–5.3)
POTASSIUM SERPL-SCNC: 3.8 MMOL/L (ref 3.4–5.3)
PROT SERPL-MCNC: 6.8 G/DL (ref 6.4–8.3)
RBC # BLD AUTO: 2.76 10E6/UL (ref 4.4–5.9)
SODIUM SERPL-SCNC: 139 MMOL/L (ref 135–145)
WBC # BLD AUTO: 5.8 10E3/UL (ref 4–11)

## 2025-07-15 PROCEDURE — 84132 ASSAY OF SERUM POTASSIUM: CPT | Performed by: INTERNAL MEDICINE

## 2025-07-15 PROCEDURE — 99232 SBSQ HOSP IP/OBS MODERATE 35: CPT | Performed by: INTERNAL MEDICINE

## 2025-07-15 PROCEDURE — 82310 ASSAY OF CALCIUM: CPT | Performed by: INTERNAL MEDICINE

## 2025-07-15 PROCEDURE — 36415 COLL VENOUS BLD VENIPUNCTURE: CPT | Performed by: INTERNAL MEDICINE

## 2025-07-15 PROCEDURE — 120N000004 HC R&B MS OVERFLOW

## 2025-07-15 PROCEDURE — 82550 ASSAY OF CK (CPK): CPT | Performed by: INTERNAL MEDICINE

## 2025-07-15 PROCEDURE — 250N000011 HC RX IP 250 OP 636: Performed by: INTERNAL MEDICINE

## 2025-07-15 PROCEDURE — 85014 HEMATOCRIT: CPT | Performed by: INTERNAL MEDICINE

## 2025-07-15 PROCEDURE — 83735 ASSAY OF MAGNESIUM: CPT | Performed by: INTERNAL MEDICINE

## 2025-07-15 PROCEDURE — 250N000013 HC RX MED GY IP 250 OP 250 PS 637: Performed by: INTERNAL MEDICINE

## 2025-07-15 RX ORDER — POTASSIUM CHLORIDE 1500 MG/1
40 TABLET, EXTENDED RELEASE ORAL ONCE
Status: COMPLETED | OUTPATIENT
Start: 2025-07-15 | End: 2025-07-15

## 2025-07-15 RX ORDER — FOLIC ACID 1 MG/1
1 TABLET ORAL DAILY
Status: DISCONTINUED | OUTPATIENT
Start: 2025-07-16 | End: 2025-07-16 | Stop reason: HOSPADM

## 2025-07-15 RX ORDER — NALTREXONE HYDROCHLORIDE 50 MG/1
50 TABLET, FILM COATED ORAL DAILY
Status: DISCONTINUED | OUTPATIENT
Start: 2025-07-15 | End: 2025-07-16 | Stop reason: HOSPADM

## 2025-07-15 RX ORDER — MAGNESIUM SULFATE HEPTAHYDRATE 40 MG/ML
4 INJECTION, SOLUTION INTRAVENOUS ONCE
Status: COMPLETED | OUTPATIENT
Start: 2025-07-15 | End: 2025-07-15

## 2025-07-15 RX ORDER — THIAMINE HYDROCHLORIDE 100 MG/ML
250 INJECTION, SOLUTION INTRAMUSCULAR; INTRAVENOUS EVERY 8 HOURS
Status: COMPLETED | OUTPATIENT
Start: 2025-07-15 | End: 2025-07-16

## 2025-07-15 RX ORDER — ACETAMINOPHEN 500 MG
500-1000 TABLET ORAL EVERY 6 HOURS PRN
Status: DISCONTINUED | OUTPATIENT
Start: 2025-07-15 | End: 2025-07-16 | Stop reason: HOSPADM

## 2025-07-15 RX ADMIN — POTASSIUM CHLORIDE 40 MEQ: 1500 TABLET, EXTENDED RELEASE ORAL at 09:02

## 2025-07-15 RX ADMIN — DIAZEPAM 10 MG: 5 TABLET ORAL at 08:26

## 2025-07-15 RX ADMIN — PANTOPRAZOLE SODIUM 40 MG: 40 INJECTION, POWDER, LYOPHILIZED, FOR SOLUTION INTRAVENOUS at 08:18

## 2025-07-15 RX ADMIN — FOLIC ACID 1 MG: 5 INJECTION, SOLUTION INTRAMUSCULAR; INTRAVENOUS; SUBCUTANEOUS at 09:03

## 2025-07-15 RX ADMIN — GABAPENTIN 900 MG: 300 CAPSULE ORAL at 20:27

## 2025-07-15 RX ADMIN — ACETAMINOPHEN 1000 MG: 500 TABLET, FILM COATED ORAL at 20:27

## 2025-07-15 RX ADMIN — NALTREXONE HYDROCHLORIDE 50 MG: 50 TABLET, FILM COATED ORAL at 18:19

## 2025-07-15 RX ADMIN — THIAMINE HYDROCHLORIDE 100 MG: 100 INJECTION, SOLUTION INTRAMUSCULAR; INTRAVENOUS at 08:17

## 2025-07-15 RX ADMIN — ACETAMINOPHEN 1000 MG: 500 TABLET, FILM COATED ORAL at 08:26

## 2025-07-15 RX ADMIN — CIPROFLOXACIN 500 MG: 500 TABLET ORAL at 08:19

## 2025-07-15 RX ADMIN — THIAMINE HYDROCHLORIDE 250 MG: 100 INJECTION, SOLUTION INTRAMUSCULAR; INTRAVENOUS at 18:19

## 2025-07-15 RX ADMIN — MAGNESIUM SULFATE HEPTAHYDRATE 4 G: 40 INJECTION, SOLUTION INTRAVENOUS at 10:31

## 2025-07-15 RX ADMIN — CIPROFLOXACIN 500 MG: 500 TABLET ORAL at 20:27

## 2025-07-15 RX ADMIN — GABAPENTIN 900 MG: 300 CAPSULE ORAL at 12:03

## 2025-07-15 RX ADMIN — GABAPENTIN 900 MG: 300 CAPSULE ORAL at 05:37

## 2025-07-15 RX ADMIN — Medication 1 TABLET: at 08:19

## 2025-07-15 ASSESSMENT — ACTIVITIES OF DAILY LIVING (ADL)
ADLS_ACUITY_SCORE: 25
ADLS_ACUITY_SCORE: 28
ADLS_ACUITY_SCORE: 25
ADLS_ACUITY_SCORE: 28
ADLS_ACUITY_SCORE: 25
ADLS_ACUITY_SCORE: 28
ADLS_ACUITY_SCORE: 25
ADLS_ACUITY_SCORE: 28
ADLS_ACUITY_SCORE: 29
ADLS_ACUITY_SCORE: 28
ADLS_ACUITY_SCORE: 25
ADLS_ACUITY_SCORE: 28
ADLS_ACUITY_SCORE: 29

## 2025-07-15 NOTE — PLAN OF CARE
Problem: Adult Inpatient Plan of Care  Goal: Plan of Care Review  Description: The Plan of Care Review/Shift note should be completed every shift.  The Outcome Evaluation is a brief statement about your assessment that the patient is improving, declining, or no change.  This information will be displayed automatically on your shift  note.  Outcome: Progressing  Flowsheets (Taken 7/14/2025 5519)  Outcome Evaluation: CIWA 3, deneis pain, good apetite, sinus rhythm on cardiac monitor, independent up to bathroom.  Plan of Care Reviewed With: patient  Overall Patient Progress: improving     Problem: Adult Inpatient Plan of Care  Goal: Optimal Comfort and Wellbeing  Outcome: Progressing  Intervention: Provide Person-Centered Care  Recent Flowsheet Documentation  Taken 7/14/2025 2000 by Lorelei Rebolledo RN  Trust Relationship/Rapport:   care explained   choices provided   questions answered   questions encouraged   reassurance provided   thoughts/feelings acknowledged     Problem: Adult Inpatient Plan of Care  Goal: Absence of Hospital-Acquired Illness or Injury  Intervention: Identify and Manage Fall Risk  Recent Flowsheet Documentation  Taken 7/14/2025 2000 by Lorelei Rebolledo RN  Safety Promotion/Fall Prevention:   activity supervised   assistive device/personal items within reach   clutter free environment maintained   increased rounding and observation   increase visualization of patient   lighting adjusted   nonskid shoes/slippers when out of bed   patient and family education  Intervention: Prevent Skin Injury  Recent Flowsheet Documentation  Taken 7/14/2025 2000 by Lorelei Rebolledo RN  Body Position: position changed independently     Problem: Risk for Delirium  Goal: Improved Behavioral Control  Intervention: Minimize Safety Risk  Recent Flowsheet Documentation  Taken 7/14/2025 2000 by Lorelei Rebolledo RN  Trust Relationship/Rapport:   care explained   choices provided   questions answered   questions  encouraged   reassurance provided   thoughts/feelings acknowledged     Problem: Adult Inpatient Plan of Care  Goal: Absence of Hospital-Acquired Illness or Injury  Intervention: Identify and Manage Fall Risk  Recent Flowsheet Documentation  Taken 7/14/2025 2000 by Lorelei Rebolledo RN  Safety Promotion/Fall Prevention:   activity supervised   assistive device/personal items within reach   clutter free environment maintained   increased rounding and observation   increase visualization of patient   lighting adjusted   nonskid shoes/slippers when out of bed   patient and family education  Intervention: Prevent Skin Injury  Recent Flowsheet Documentation  Taken 7/14/2025 2000 by Lorelei Rebolledo RN  Body Position: position changed independently     Problem: Risk for Delirium  Goal: Improved Behavioral Control  Intervention: Minimize Safety Risk  Recent Flowsheet Documentation  Taken 7/14/2025 2000 by Lorelei Rebolledo RN  Trust Relationship/Rapport:   care explained   choices provided   questions answered   questions encouraged   reassurance provided   thoughts/feelings acknowledged     Problem: Adult Inpatient Plan of Care  Goal: Absence of Hospital-Acquired Illness or Injury  Intervention: Identify and Manage Fall Risk  Recent Flowsheet Documentation  Taken 7/14/2025 2000 by Lorelei Rebolledo RN  Safety Promotion/Fall Prevention:   activity supervised   assistive device/personal items within reach   clutter free environment maintained   increased rounding and observation   increase visualization of patient   lighting adjusted   nonskid shoes/slippers when out of bed   patient and family education  Intervention: Prevent Skin Injury  Recent Flowsheet Documentation  Taken 7/14/2025 2000 by Lorelei Rebolledo RN  Body Position: position changed independently     Problem: Risk for Delirium  Goal: Improved Behavioral Control  Intervention: Minimize Safety Risk  Recent Flowsheet Documentation  Taken 7/14/2025 2000 by Amaury  Lorelei MAS RN  Trust Relationship/Rapport:   care explained   choices provided   questions answered   questions encouraged   reassurance provided   thoughts/feelings acknowledged     Problem: Adult Inpatient Plan of Care  Goal: Absence of Hospital-Acquired Illness or Injury  Intervention: Identify and Manage Fall Risk  Recent Flowsheet Documentation  Taken 7/14/2025 2000 by Lorelei Rebolledo RN  Safety Promotion/Fall Prevention:   activity supervised   assistive device/personal items within reach   clutter free environment maintained   increased rounding and observation   increase visualization of patient   lighting adjusted   nonskid shoes/slippers when out of bed   patient and family education  Intervention: Prevent Skin Injury  Recent Flowsheet Documentation  Taken 7/14/2025 2000 by Lorelei Rebolledo RN  Body Position: position changed independently     Problem: Risk for Delirium  Goal: Improved Behavioral Control  Intervention: Minimize Safety Risk  Recent Flowsheet Documentation  Taken 7/14/2025 2000 by Lorelei Rebolledo RN  Trust Relationship/Rapport:   care explained   choices provided   questions answered   questions encouraged   reassurance provided   thoughts/feelings acknowledged   Goal Outcome Evaluation:      Plan of Care Reviewed With: patient    Overall Patient Progress: improvingOverall Patient Progress: improving    Outcome Evaluation: CIWA 3, deneis pain, good apetite, sinus rhythm on cardiac monitor, independent up to bathroom.

## 2025-07-15 NOTE — PLAN OF CARE
"    Neuro: A&Ox4, Last 2 CIWA scores were 2- for tremors     Cardiac: VSS    Resp: RA, lung sounds are clear    GI/: Continent of B&B    MSK: SBA, pt walked hallways, reported lightheadedness with initial standing    Skin: WNL    LDAs: 1 PIV SL's      Problem: Adult Inpatient Plan of Care  Goal: Plan of Care Review  Description: The Plan of Care Review/Shift note should be completed every shift.  The Outcome Evaluation is a brief statement about your assessment that the patient is improving, declining, or no change.  This information will be displayed automatically on your shift  note.  Outcome: Progressing  Goal: Patient-Specific Goal (Individualized)  Description: You can add care plan individualizations to a care plan. Examples of Individualization might be:  \"Parent requests to be called daily at 9am for status\", \"I have a hard time hearing out of my right ear\", or \"Do not touch me to wake me up as it startles  me\".  Outcome: Progressing  Goal: Absence of Hospital-Acquired Illness or Injury  Outcome: Progressing  Intervention: Identify and Manage Fall Risk  Recent Flowsheet Documentation  Taken 7/15/2025 0826 by Fidelina Curran RN  Safety Promotion/Fall Prevention:   activity supervised   lighting adjusted   nonskid shoes/slippers when out of bed   patient and family education   room door open   room near nurse's station  Intervention: Prevent Skin Injury  Recent Flowsheet Documentation  Taken 7/15/2025 1600 by Fidelina Curran RN  Body Position: position changed independently  Taken 7/15/2025 1400 by Fidelina Curran RN  Body Position: position changed independently  Taken 7/15/2025 1300 by Fidelina Curran RN  Body Position: position changed independently  Taken 7/15/2025 1222 by Fidelina Curran RN  Body Position: position changed independently  Taken 7/15/2025 0826 by Fidelina Curran RN  Body Position: position changed independently  Goal: Optimal Comfort and " Wellbeing  Outcome: Progressing  Intervention: Monitor Pain and Promote Comfort  Recent Flowsheet Documentation  Taken 7/15/2025 1600 by Fidelina Curran RN  Pain Management Interventions:   care clustered   emotional support   quiet environment facilitated   rest  Taken 7/15/2025 0826 by Fidelina Curran RN  Pain Management Interventions:   medication (see MAR)   care clustered   emotional support   quiet environment facilitated   rest  Intervention: Provide Person-Centered Care  Recent Flowsheet Documentation  Taken 7/15/2025 0826 by Fidelina Curran RN  Trust Relationship/Rapport:   care explained   choices provided   emotional support provided   questions answered   reassurance provided   thoughts/feelings acknowledged  Goal: Readiness for Transition of Care  Outcome: Progressing     Problem: Risk for Delirium  Goal: Optimal Coping  Outcome: Progressing  Goal: Improved Behavioral Control  Outcome: Progressing  Intervention: Minimize Safety Risk  Recent Flowsheet Documentation  Taken 7/15/2025 0826 by Fidelina Curran RN  Enhanced Safety Measures: room near unit station  Trust Relationship/Rapport:   care explained   choices provided   emotional support provided   questions answered   reassurance provided   thoughts/feelings acknowledged  Goal: Improved Attention and Thought Clarity  Outcome: Progressing  Goal: Improved Sleep  Outcome: Progressing     Problem: Alcohol Withdrawal  Goal: Alcohol Withdrawal Symptom Control  Outcome: Progressing  Goal: Optimal Neurologic Function  Outcome: Progressing  Goal: Readiness for Change Identified  Outcome: Progressing   Goal Outcome Evaluation:

## 2025-07-15 NOTE — PROGRESS NOTES
[facility] Medicine Progress Note  Date of Service: 07/15/2025    Assessment & Plan   Roland Londono is a 40 year old male with h/o alcohol dependence without h/o severe withdrawal who presented on 7/13/2025 to [facility] ED with episode of shaking associated with loss of consciousness and later transferred on 7/14/2025 to [facility] for bed availability for treatment of suspected alcohol withdrawal seizure and alcohol withdrawal.     Suspect alcohol withdrawal seizure  Alcohol withdrawal  Alcohol dependence    H/o heavy alcohol use, up to 1L vodka daily, and had decided to wean himself off starting a week PTA. He had weaned down to wine coolers on day of admission. No h/o DTs or prior seizure. Had been hallucinating prior to witnessed onset of shaking with loss of responsiveness and foaming at the mouth lasting about a minute per S.O. Head CT normal except for some mild generalized volume loss. BAL neg.     Tremulous on admission but normal mentation. Started on gabapentin protocol and symptom-triggered diazepam per CIWA. Received 1 dose of diazepam again this AM 7/15. Appears to be improving. Tremulousness resolving.     Neuropathic discomfort is helped by gabapentin, but patient feels strong cravings, especially when the neuropathy is picking up, and is fearful he will relapse quickly if discharged. Open to starting naltrexone to help with his recovery by decreasing cravings.    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 The gabapentin seems to be helping with this.   - continue gabapentin    Elevated lactic acid    In ED, LA 5.1 with AG 32 and serum bicarb 16.  Normal VpH 7.41. No evidence of  "infection and doubt sepsis. Likely due to seizure and dehydration.     Resolved with IV fluids.     Hypokalemia, moderate    K 2.7 on arrival. Likely due to chronic alcohol use and increased adrenergic tone on admission. Placed on replacement protocol.     K 3.2   - continue replacement protocol    Hypomagnesemia    Mg 1.1 on arrival. Given magnesium sulfate bolus in ED.     Mg 1.6 on recheck. Given 2g magnesium sulfate IV.     Mg 1.3   - magnesium sulfate 4g IV today, recheck in AM      Acute kidney injury     Presented to ED with creatinine 1.48, baseline 0.81. Likely due to dehydration.     Resolved, creatinine 1.01    Clinically Significant Risk Factors        # Hypokalemia: Lowest K = 2.9 mmol/L in last 2 days, will replace as needed   # Hypochloremia: Lowest Cl = 89 mmol/L in last 2 days, will monitor as appropriate    # Hypomagnesemia: Lowest Mg = 1.1 mg/dL in last 2 days, will replace as needed  # Anion Gap Metabolic Acidosis: Highest Anion Gap = 32 mmol/L in last 2 days, will monitor and treat as appropriate                   # Overweight: Estimated body mass index is 27.6 kg/m  as calculated from the following:    Height as of this encounter: 1.88 m (6' 2\").    Weight as of this encounter: 97.5 kg (214 lb 15.2 oz)., PRESENT ON ADMISSION            Diet: Combination Diet Regular Diet Adult, Regular Diet    DVT Prophylaxis: Low Risk/Ambulatory with no VTE prophylaxis indicated  Chan Catheter: Not present  Lines: None     Cardiac Monitoring: ACTIVE order. Indication: Electrolyte Imbalance (24 hours)- Magnesium <1.3 mg/ml; Potassium < =2.8 or > 5.5 mg/ml  Code Status: Full Code      Discussion/Disposition: Improving.  Starting trial of naltrexone and plan discharge tomorrow.    Medically Ready for Discharge: Anticipated Tomorrow         Medical Decision Making       35 MINUTES SPENT BY ME on the date of service doing chart review, history, exam, documentation & further activities per the note.        Chandana " MD Monica  [facility] Medicine    [facility]  Securely message with [employer] (more info)  Text page via [email or web address] Paging/Directory       Interval History   Gabapentin helps with the neuropathic pain and tingling but when symptoms are coming back he immediately has cravings as alcohol used to take care of it.  He is concerned if he is discharged right now that he will quickly relapse.  No hallucinations.  Ambulating has improved though feels a little bit of lightheadedness.    Physical Exam   Temp:  [97.7  F (36.5  C)-98.3  F (36.8  C)] 97.7  F (36.5  C)  Pulse:  [77-84] 78  Resp:  [16-20] 18  BP: ()/(73-88) 111/76  SpO2:  [98 %-99 %] 98 %    Weights:   Vitals:    07/14/25 0414   Weight: 97.5 kg (214 lb 15.2 oz)    Body mass index is 27.6 kg/m .    Constitutional: Alert and oriented x 3, mild fine tremulousness  CV: Regular, no edema  Respiratory: CTA bilaterally  GI: Soft, non-tender, bowel sounds normal  Skin: Warm and dry    Data   Recent Labs   Lab 07/15/25  1359 07/15/25  0650 07/14/25  1949 07/14/25  0659 07/14/25  0321 07/13/25  2108   WBC  --  5.8  --  6.5  --  7.5   HGB  --  9.6*  --  9.9*  --  11.1*   MCV  --  104*  --  101*  --  103*   PLT  --  218  --  161  --  162   NA  --  139  --  137 137 137   POTASSIUM 3.8 3.2* 3.7 3.0* 3.0* 2.9*   CHLORIDE  --  101  --  96* 97* 89*   CO2  --  23  --  23 23 16*   BUN  --  16.9  --  16.6 15.7 17.2   CR  --  1.01  --  1.02 1.04 1.48*   ANIONGAP  --  15  --  18* 17* 32*   MAURICE  --  8.9  --  9.2 9.4 10.4   GLC  --  99  --  106* 123* 161*   ALBUMIN  --  4.0  --   --   --  4.8   PROTTOTAL  --  6.8  --   --   --  8.5*   BILITOTAL  --  0.8  --   --   --  1.6*   ALKPHOS  --  72  --   --   --  80   ALT  --  32  --   --   --  40   AST  --  104*  --   --   --  162*   LIPASE  --   --   --   --   --  10*       Recent Labs   Lab 07/15/25  0650 07/14/25  0659 07/14/25  0321 07/13/25  2108   GLC 99 106* 123* 161*        Unresulted Labs Ordered in the Past  30 Days of this Admission       No orders found for last 31 day(s).             Imaging: No results found for this or any previous visit (from the past 24 hours).     I reviewed all new labs and imaging results over the last 24 hours. I personally reviewed no images or EKG's today.    Medications   Current Facility-Administered Medications   Medication Dose Route Frequency Provider Last Rate Last Admin     Current Facility-Administered Medications   Medication Dose Route Frequency Provider Last Rate Last Admin    ciprofloxacin (CIPRO) tablet 500 mg  500 mg Oral Q12H Pending sale to [facility] (08/20) Chandana Anderson MD   500 mg at 07/15/25 0819    folic acid injection 1 mg  1 mg Intravenous Daily Julissa Calvo MD   1 mg at 07/15/25 0903    [START ON 7/21/2025] gabapentin (NEURONTIN) capsule 100 mg  100 mg Oral Q8H Julissa Calvo MD        [START ON 7/19/2025] gabapentin (NEURONTIN) capsule 300 mg  300 mg Oral Q8H Julissa Calvo MD        [START ON 7/17/2025] gabapentin (NEURONTIN) capsule 600 mg  600 mg Oral Q8H Julissa Calvo MD        gabapentin (NEURONTIN) capsule 900 mg  900 mg Oral Q8H Julissa Calvo MD   900 mg at 07/15/25 1203    multivitamin w/minerals (THERA-VIT-M) tablet 1 tablet  1 tablet Oral Daily Julissa Calvo MD   1 tablet at 07/15/25 0819    naltrexone (DEPADE/REVIA) tablet 50 mg  50 mg Oral Daily Chandana Anderson MD        pantoprazole (PROTONIX) injection 40 mg  40 mg Intravenous Daily with breakfast Julissa Calvo MD   40 mg at 07/15/25 0818    sodium chloride (PF) 0.9% PF flush 3 mL  3 mL Intracatheter Q8H Pending sale to [facility] Julissa Calvo MD   3 mL at 07/15/25 1227    thiamine (B-1) injection 250 mg  250 mg Intravenous Q8H Chandana Anderson MD Jay Hemmila, MD  [facility] Medicine

## 2025-07-15 NOTE — PROGRESS NOTES
End Of Shift Note     Situation: ETOH withdrawal      Plan: RE check CIWA in 4hrs if score 7 or above, Cardiac monitoring, Seizure precautions, K+ protocol.    Pt is interested in treatment for alcohol use after discharge.            Neuro: A&O x 4, 0400:  ciwa score 0  2000:  CIWA 3 (pt reported seeing shadows to his right side one time and it has gone away.)    Cardiac: NSR     Resp: RA     GI/: Last bm yesterday AM.    Walks independently to bathroom, voided x4 overnight.      MSK: Independent      Skin: No skin issues.     LDAs: Right PIV, clean dry intact, saline locked. Flushed well.

## 2025-07-16 VITALS
SYSTOLIC BLOOD PRESSURE: 137 MMHG | DIASTOLIC BLOOD PRESSURE: 106 MMHG | RESPIRATION RATE: 18 BRPM | BODY MASS INDEX: 27.59 KG/M2 | TEMPERATURE: 97.9 F | HEART RATE: 83 BPM | OXYGEN SATURATION: 98 % | HEIGHT: 74 IN | WEIGHT: 214.95 LBS

## 2025-07-16 LAB
ANION GAP SERPL CALCULATED.3IONS-SCNC: 12 MMOL/L (ref 7–15)
BUN SERPL-MCNC: 16.4 MG/DL (ref 6–20)
CALCIUM SERPL-MCNC: 9.2 MG/DL (ref 8.8–10.4)
CHLORIDE SERPL-SCNC: 105 MMOL/L (ref 98–107)
CREAT SERPL-MCNC: 0.88 MG/DL (ref 0.67–1.17)
EGFRCR SERPLBLD CKD-EPI 2021: >90 ML/MIN/1.73M2
GLUCOSE SERPL-MCNC: 109 MG/DL (ref 70–99)
HCO3 SERPL-SCNC: 24 MMOL/L (ref 22–29)
HOLD SPECIMEN: NORMAL
MAGNESIUM SERPL-MCNC: 1.2 MG/DL (ref 1.7–2.3)
POTASSIUM SERPL-SCNC: 4.1 MMOL/L (ref 3.4–5.3)
SODIUM SERPL-SCNC: 141 MMOL/L (ref 135–145)

## 2025-07-16 PROCEDURE — 250N000013 HC RX MED GY IP 250 OP 250 PS 637: Performed by: INTERNAL MEDICINE

## 2025-07-16 PROCEDURE — 250N000011 HC RX IP 250 OP 636: Performed by: INTERNAL MEDICINE

## 2025-07-16 PROCEDURE — 80048 BASIC METABOLIC PNL TOTAL CA: CPT | Performed by: INTERNAL MEDICINE

## 2025-07-16 PROCEDURE — 99239 HOSP IP/OBS DSCHRG MGMT >30: CPT | Performed by: INTERNAL MEDICINE

## 2025-07-16 PROCEDURE — 83735 ASSAY OF MAGNESIUM: CPT | Performed by: INTERNAL MEDICINE

## 2025-07-16 PROCEDURE — 36415 COLL VENOUS BLD VENIPUNCTURE: CPT | Performed by: INTERNAL MEDICINE

## 2025-07-16 RX ORDER — NALTREXONE HYDROCHLORIDE 50 MG/1
50 TABLET, FILM COATED ORAL DAILY
Qty: 30 TABLET | Refills: 0 | Status: SHIPPED | OUTPATIENT
Start: 2025-07-17

## 2025-07-16 RX ORDER — GABAPENTIN 300 MG/1
300 CAPSULE ORAL 3 TIMES DAILY
Qty: 90 CAPSULE | Refills: 0 | Status: SHIPPED | OUTPATIENT
Start: 2025-07-16

## 2025-07-16 RX ADMIN — PANTOPRAZOLE SODIUM 40 MG: 40 INJECTION, POWDER, LYOPHILIZED, FOR SOLUTION INTRAVENOUS at 08:23

## 2025-07-16 RX ADMIN — GABAPENTIN 900 MG: 300 CAPSULE ORAL at 05:44

## 2025-07-16 RX ADMIN — CIPROFLOXACIN 500 MG: 500 TABLET ORAL at 08:23

## 2025-07-16 RX ADMIN — THIAMINE HYDROCHLORIDE 250 MG: 100 INJECTION, SOLUTION INTRAMUSCULAR; INTRAVENOUS at 10:23

## 2025-07-16 RX ADMIN — NALTREXONE HYDROCHLORIDE 50 MG: 50 TABLET, FILM COATED ORAL at 08:28

## 2025-07-16 RX ADMIN — Medication 1 TABLET: at 08:23

## 2025-07-16 RX ADMIN — THIAMINE HYDROCHLORIDE 250 MG: 100 INJECTION, SOLUTION INTRAMUSCULAR; INTRAVENOUS at 02:45

## 2025-07-16 RX ADMIN — GABAPENTIN 900 MG: 300 CAPSULE ORAL at 12:26

## 2025-07-16 RX ADMIN — FOLIC ACID 1 MG: 1 TABLET ORAL at 08:23

## 2025-07-16 ASSESSMENT — ACTIVITIES OF DAILY LIVING (ADL)
ADLS_ACUITY_SCORE: 29

## 2025-07-16 NOTE — DISCHARGE INSTRUCTIONS
Met with patient to discuss possible JOR[facility] L treatment options and to possibly complete an assessment. Assessment has been completed at this time and patient is being recommended:          Recommendations:   Recommendations: Patient meets criteria for the following levels of care based on ASAM Criteria:    Withdrawal Management - No Withdrawal Management Indicated  Treatment/Recovery Services - 2.1 Intensive Outpatient Services.  Patient's placement align's with the assessment and placement level of care recommendation based on current ASAM Dimension scale ratings.     Patient reports they are willing to follow these recommendations.    Patient would like the following family or other support people involved in their treatment:  NA.   Patient does not have a history of opiate use.     Patient should get insurance and then call to begin IOP treatment.      Referrals/ Alternatives:  [facility] [id number] [place], [place] [id number] [phone]     [facility] [address] [place] [address]  [phone]     Referrals are being made at this time.  Patient is aware of the referrals and is in agreement. Patient gave verbal BOOGIE's for referrals to [facility].

## 2025-07-16 NOTE — PROGRESS NOTES
Care Management Note:    SW entered EMR as MAIA Mata, requested to have pt sign BOOGIE forms for IOP SUDS treatment.    SW assisted with completion of the documents, faxed to the ICU and requested staff have the pt sign documents & place into his chart, & later to be scanned into his EMR.    TAISHA De Santiago

## 2025-07-16 NOTE — PROGRESS NOTES
UNIVERSAL ADULT Substance Use Disorder DIAGNOSTIC ASSESSMENT     Referral Source: [facility]   Unit Number: [id number]     DATE OF SERVICE: 2025   Date of previous JORGE L Assessment:2024 didn't follow up with referrals   Patient confirmed identity through two factor verification: Full Legal Name, , and SSN    PATIENT'S NAME: Roland Londono  PATIENT'S PREFERRED NAME: Roland  Pronouns:       Age: 40 year old  SSN:     Sex: male    Gender Identity: male    Sexual Orientation: Heterosexual  Cultural Background:   MRN: [id number]  YOB: 1985  Current Address:   [address] [place] [address]  START TIME: 1125  END TIME: 1220  Patient Phone Number: [phone]  May we leave a program related message: Yes   Patient's E-Mail Contact: [email or web address]  Funding: No coverage found.     PMI: NA   Emergency Contact:   Name Home Phone Work Phone Mobile Phone Relationship Lgl Grd   LILA AYALA   [phone] Spouse    YULISA AYALA [phone]   Significant*       DAANES information was provided to patient and patient does not want a copy.   Telemedicine Visit: The patient's condition can be safely assessed and treated via synchronous audio and visual telemedicine encounter.    Reason for Telemedicine Visit: Patient unable to travel, Patient convenience (e.g. access to timely appointments / distance to available provider), and Patient required immediate assessment / treatment   Originating Site (Patient Location): [facility] - [address] [place] [id number]  Distant Site (Provider Location): Provider Remote Setting- Home Office  Consent:  The patient/guardian has verbally consented to: the potential risks and benefits of telemedicine (video visit) versus in person care; bill my insurance or make self-payment for services provided; and responsibility for payment of non-covered services.   Mode of  "Communication:  Phone- per pt request  As the provider I attest to compliance with applicable laws and regulations related to telemedicine.        UNIVERSAL ADULT Substance Use Disorder DIAGNOSTIC ASSESSMENT    Identifying Information:  Patient is a 40 year old,    individual.  Patient was referred for an assessment by self .  Patient attended the session alone.    Chief Complaint:   The reason for seeking services at this time is: \" History of Present Illness:  Roland Londono is a 40 year old male  alcohol use disorder, presents to the emergency room after he experienced a seizure,   Patient stated that he drinks about a liter of alcohol a day however yesterday was his last drink.  At home he was having chills, and shaking.  It was reported that he fell, and started frothing at the mouth and had what appeared to be a convulsion.  Prior to this the patient was having some visual hallucinations, and cold sweats.   He denied that he had any vomiting but said that he was having dry heaves.  He stated that he was lightheaded and dizzy upon standing.  He also complained of a sharp chest pain, that would come and go lasting only 30 seconds, and nonradiating,  not associated with any sweating, nausea or vomiting.  \"         The problem(s) began \"At least 15 years ago\". Patient has attempted to resolve these concerns in the past through 2 past treatments, most recently 2020, somewhere in [facility].  Patient does not appear to be in severe withdrawal, an imminent safety risk to self or others, or requiring immediate medical attention and may proceed with the assessment interview.    Social/Family History:  Patient reported they grew up in [facility] [place] [facility] [facility] [place].  They were raised by Mom and grandparents.  Patient reported that their childhood was \"Wasn't bad but wasn't great\".  Patient describes current relationships with family of origin as grandfather just passed last week, grandmother and mom are " "great.        The patient describes their cultural background as .  Cultural influences and impact on patient's life structure, values, norms, and healthcare: \"I don't think so\".  Contextual influences on patient's health include: NA.  Patient identified their preferred language to be English. Patient reported they do not need the assistance of an  or other support involved in therapy.  Patient reports they are not involved in community of arjun activities.  They reports spirituality impacts recovery in the following ways:  NA.     Patient reported experienced significant delays in developmental tasks, such as \"Had an IEP for math and social studies, that's about it\".   Patient's highest education level was GED and some college. Patient identified the following learning problems: none reported.  Patient reports they are  able to understand written materials.    Patient reported the following relationship history toxic, Multiple relationships lasted 10 years.  2 Relationships with kids and lasted 10 years.   Patient's current relationship status is 2 possible significant others at this time for NA.   Patient identified their sexual orientation as heterosexual.  Patient reported having three child(anabel).     Patient's current living/housing situation involves staying in own home/apartment.  They live with Girlfriend and they report that housing is stable. Patient identified \"everybody around me\" as part of their support system.  Patient identified the quality of these relationships as good.      Patient reports engaging in the following recreational/leisure activities: Basketball, hanging out with kids.  Patient reports the following people are supportive of recovery: \"everybody around me\".  Patient is currently employed full time and reports they are able to function appropriately at work..  Patient reports their income is obtained through employment.  Patient does identify finances as a " "current stressor.  Cultural and socioeconomic factors do affect the patient's access to services.      Patient reports the following substance related arrests or legal issues: 3 DWIs, conterfeit, .  Patient does report being on probation / parole / under the jurisdiction of the court: : \"I do but I don't remember his name\". County: [facility]. Due to DWI    Patient's Strengths and Limitations:  Patient identified the following strengths or resources that will help them succeed in treatment: \"everybody around me\" basketball. Things that may interfere with the patient's success in treatment include: \"Myself\".     Assessments:  The following assessments were completed by patient for this visit:  GAIN-sliding scale:      7/16/2025    11:00 AM   When was the last time that you had significant problems...   with feeling very trapped, lonely, sad, blue, depressed or hopeless about the future? Past month   with sleep trouble, such as bad dreams, sleeping restlessly, or falling asleep during the day? Past Month   with feeling very anxious, nervous, tense, scared, panicked or like something bad was going to happen? Past month   with becoming very distressed & upset when something reminded you of the past? Past month   with thinking about ending your life or committing suicide? Never          7/16/2025    11:00 AM   When was the last time that you did the following things 2 or more times?   Lied or conned to get things you wanted or to avoid having to do something? 1+ years ago   Had a hard time paying attention at school, work or home? Past month   Had a hard time listening to instructions at school, work or home? Past month   Were a bully or threatened other people? Never   Started physical fights with other people? Never       Personal and Family Medical History:  Patient did not report a family history of mental health concerns.  Patient reports family history includes Asthma in his daughter..  "     Patient reported the following previous mental health diagnoses: NA.  Patient reports their primary mental health symptoms include:  NA and these do not impact his ability to function.   Patient has not received mental health services in the past: NA.  Psychiatric Hospitalizations: None.  Patient denies a history of civil commitment.  Current mental health services/providers include:  NA.    Patient has not had a physical exam to rule out medical causes for current symptoms.  Date of last physical exam was greater than a year ago and client was encouraged to schedule an exam with PCP. The patient does not have a Primary Care Provider and was encouraged to establish care with a PCP. Goes to a clinic off of [address].  Patient reports the following current medical concerns: High blood pressure.  Patient has nerve pain. Patient denies pregnancy..  There are not significant appetite / nutritional concerns / weight changes.  Patient does not report a history of an eating disorder.  Patient does not report a history of head injury / trauma / cognitive impairment.  NA    Current Outpatient Medications   Medication Instructions    gabapentin (NEURONTIN) 300 mg, Oral, 3 TIMES DAILY    [START ON 7/17/2025] naltrexone (DEPADE/REVIA) 50 mg, Oral, DAILY         Medication Adherence:  Patient reports Doesn't take any psych medications.  Patient is able to self-administer medications.      Patient Allergies:  No Known Allergies    Medical History:  No past medical history on file.      Substance Use:   Patient reported the following biological family members or relatives with chemical health issues:  grandfather, mom, ..  Patient States he has had a problem with gambling in the past..  Patient has not ever been to detox.  Patient is not currently receiving any chemical dependency treatment. Patient reports they have attended the following support groups: AA in the past.  But not recently.        Substance Age of first use  "Pattern and duration of use (include amounts and frequency) Date of last use     Withdrawal potential Route of administration   has used Alcohol 18 Daily other than Sunday, Pint per day 7/13/25 No oral   has used Cannabis   16 1-2x every 6 months, couple puffs 5/16/25 No smoked     has not used Amphetamines          has not used Cocaine/crack           has not used Hallucinogens        has not used Inhalants        has not used Heroin        has not used Other Opiates        has not used Benzodiazepine          has not used Barbiturates        has not used Over the counter meds.        has not use Caffeine        has used Nicotine  18 1/3 PPD Ongoing No smoked   has not used other substances not listed above:  Identify:             Patient reported the following problems as a result of their substance use: DUI and legal issues.  Patient is concerned about substance use. Patient reports he is concerned about their substance use.  Patient reports they obtain substances by Legal.  Patient reports their recovery goals are \"I want to quit drinking, that's my goal\".     Patient reports experiencing the following withdrawal symptoms within the past 12 months: sweating, shaky/jittery/tremors, muscle aches, nausea/vomiting, and hallucinations and the following within the past 30 days: sweating, shaky/jittery/tremors, muscle aches, nausea/vomiting, and hallucinations.   Patients reports urges to use Alcohol.  Patient reports he has used more Alcohol than intended and over a longer period of time than intended. Patient reports he has had unsuccessful attempts to cut down or control use of Alcohol.  Patient reports longest period of abstinence was 5 months, in 2000 and return to use was due to \"I wasn't ready to quit\". Patient reports he has needed to use more Alcohol to achieve the same effect.  Patient does  report diminished effect with use of same amount of Alcohol.     Patient does  report a great deal of time is spent in " activities necessary to obtain, use, or recover from Alcohol effects.  Patient does not report important social, occupational, or recreational activities are given up or reduced because of Alcohol use.  Alcohol use is continued despite knowledge of having a persistent or recurrent physical or psychological problem that is likely to have caused or exacerbated by use.     Patient reports substance use has not ever impacted their ability to function in a school setting. Patient reports substance use has ever impacted their ability to function in a work setting.  Patients demographics and history impact their recovery in the following ways:  Hung over and didn't want to go to work.  Patient reports engaging in the following recreation/leisure activities while using:  Gambling, sleep.  Patient reports the following people are supportive of recovery: Everyone    Patient does not have a history of gambling concerns and/or treatment NA.  Patient does not have other addictive behaviors he is concerned about NA.      Dimension Scale Ratings:    [facility] Dimension Scale Ratings:    Dimension Scale Ratings:      Dimension 1 -  Acute Intoxication/Withdrawal: 0 - No Problem  Summary to support rating:  Withdrawal Management - No Withdrawal Management Indicated.    Dimension 2 - Biomedical: 0 - No Problem  Summary to support rating:  Patient denies any medical issues or concerns.      Dimension 3 - Emotional/Behavioral/Cognitive Conditions: 1 - Minor Problem  Summary to support rating:  Patient denies any mental health diagnosis    Dimension 4 - Readiness to Change: 2 - Moderate Problem  Summary to support rating:  Patient appears willing to do treatment.  Patient states he has a history of 3 DWI's and is currently on probation. Patient states he didn't follow through on his previous referrals.     Dimension 5 - Relapse/Continued Use/ Continued Problem Potential: 4 - Extreme Problem  Summary to support rating:  Patient appears to be  at very high risk for further use and use related consequences at this time. Patient appears to have minimal skills or tools to prevent relapse.      Dimension 6 - Recovery Environment: 3 - Severe Problem  Summary to support ratin  Patient reports that their current living situation is supportive towards their recovery. Pt reports that they are currently living with girlfriend.  Pt is currently Employed Full time. Pt reports fracturing relationships with family and friends due to their use. Pt lacks a sober support network. Pt reports that they have some legal involvement for 3 DWI's and is on probation for it. Patient states he hasn't recently been participating in any sober support groups.       Significant Losses / Trauma / Abuse / Neglect Issues:   Patient   did not serve in the .  There are indications or report of significant loss, trauma, abuse or neglect issues related to: Good friend was shot and hit in the head, grandfather  and was buried last week. .  Patient has not been a victim of exploitation.  Concerns for possible neglect are not present.     Safety Assessment:   Patient denies current or past homicidal ideation and behaviors.  Patient denies current or past suicidal ideation and behaviors.  Patient denies current or past self-injurious behaviors.  Patient denied risk behaviors associated with substance use.  Patient denies any high risk behaviors associated with mental health symptoms.  Patient denied current or past personal safety concerns.    Patient denies past of current/recent assaultive behaviors.    Patient denied a history of sexual assault behaviors.     Patient reports there are not  ,   firearms in the house.    Patient reports the following protective factors: hopeful, identifies reason for living, and access to and engagement with healthcare      Risk Plan:  See Recommendations for Safety and Risk Management Plan    Review of Symptoms per patient report:   Substance  Use:    hangovers, daily use, and substance related legal problems     Collateral Contact Summary:   The patient's medical record at [facility] was reviewed and the information contained in the medical record supported the patient's account of his chemical use history and chemical use consequences.    Diagnostic Criteria:  1.) Alcohol/drug is often taken in larger amounts or over a longer period than was intended.  2.) There is a persistent desire or unsuccessful efforts to cut down or control alcohol/drug use  3.) A great deal of time is spent in activities necessary to obtain alcohol, use alcohol, or recover from its effects.  4.) Craving, or a strong desire or urge to use alcohol/drug  6.) Continued alcohol use despite having persistent or recurrent social or interpersonal problems caused or exacerbated by the effects of alcohol/drug.  8.) Recurrent alcohol/drug use in situations in which it is physically hazardous.  10.) Tolerance, as defined by either of the following: A need for markedly increased amounts of alcohol/drug to achieve intoxication or desired effect.  11.) Withdrawal, as manifested by either of the following: The characteristic withdrawal syndrome for alcohol/drug (refer to Criteria A and B of the criteria set for alcohol/drug withdrawal).    As evidenced by self report and criteria, client meets the following DSM5 Diagnoses:   (Sustained by DSM5 Criteria Listed Above)    Category of Substance Severity (ICD-10 Code / DSM 5 Code)      Alcohol Use Disorder Severe  (10.20) (303.90)   Cannabis Use Disorder The patient does not meet the criteria for a Cannabis use disorder.   Hallucinogen Use Disorder The patient does not meet the criteria for a Hallucinogen use disorder.   Inhalant Use Disorder The patient does not meet the criteria for an Inhalant use disorder.   Opioid Use Disorder The patient does not meet the criteria for an Opioid use disorder.   Sedative, Hypnotic, or Anxiolytic Use  Disorder The patient does not meet the criteria for a Sedative/Hypnotic use disorder.   Stimulant Related Disorder The patient does not meet the criteria for a Stimulant use disorder.   Tobacco Use Disorder Mild    (Z72.0) (305.1)   Other (or unknown) Substance Use Disorder The patient does not meet the criteria for a Other (or unknown) Substance use disorder.     Recommendations:   Recommendations: Patient meets criteria for the following levels of care based on ASAM Criteria:    Withdrawal Management - No Withdrawal Management Indicated  Treatment/Recovery Services - 2.1 Intensive Outpatient Services.  Patient's placement align's with the assessment and placement level of care recommendation based on current ASAM Dimension scale ratings.     Patient reports they are willing to follow these recommendations.    Patient would like the following family or other support people involved in their treatment:  NA.   Patient does not have a history of opiate use.    Clinical Substantiation:  Patient would benefit from continuing to develop long-term sober maintenance skills, would benefit from continuing to develop sober coping skills, and would benefit from continuing to develop a sober peer support network     Referrals/ Alternatives:  [facility] [address] [place] [address]  [phone]    [facility] [address] [place] [address]  [phone]    JORGE L consult completed by:   Erik Minaya, [facility]   JORGE L Evaluation Counselor  Email: [email or web address] ; Phone: [phone]

## 2025-07-16 NOTE — PROGRESS NOTES
[facility] [facility] DISCHARGE NOTE    Patient discharged to home at 3:15 PM via ambulation. Accompanied by other:self and staff. Discharge instructions reviewed with patient, opportunity offered to ask questions. Prescriptions sent to patients preferred pharmacy. All belongings sent with patient.    Kimberly Pete RN

## 2025-07-16 NOTE — CONSULTS
Met with patient to discuss possible JORGE L treatment options and to possibly complete an assessment. Assessment has been completed at this time and patient is being recommended:         Recommendations:   Recommendations: Patient meets criteria for the following levels of care based on ASAM Criteria:    Withdrawal Management - No Withdrawal Management Indicated  Treatment/Recovery Services - 2.1 Intensive Outpatient Services.  Patient's placement align's with the assessment and placement level of care recommendation based on current ASAM Dimension scale ratings.     Patient reports they are willing to follow these recommendations.    Patient would like the following family or other support people involved in their treatment:  NA.   Patient does not have a history of opiate use.     Referrals/ Alternatives:  [facility] [address] [place] [address]  [phone]     [facility] [address] [place] [address]  [phone]    Referrals are being made at this time.  Patient is aware of the referrals and is in agreement. Patient gave verbal BOOGIE's for referrals to [facility].     MAIA Box on 7/16/2025 at 12:23 PM

## 2025-07-16 NOTE — DISCHARGE SUMMARY
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  Call member services at your health plan with any benefit or coverage questions.    Schedule Primary Care visit within: 7 Days             Discharge Disposition   Discharged to home    Hospital Course   Roland Londono is a 40 year old male with h/o alcohol dependence without h/o severe withdrawal who presented on 7/13/2025 to [facility] ED with episode of shaking associated with loss of consciousness and later transferred on 7/14/2025 to [facility] for bed availability for treatment of suspected alcohol withdrawal seizure and alcohol withdrawal.     Suspect alcohol withdrawal seizure  Alcohol withdrawal  Alcohol dependence    H/o heavy alcohol use, up to 1L vodka daily, and had decided to wean himself off starting a week PTA. He had weaned down to wine coolers on day of admission. No h/o DTs or prior seizure. Had been hallucinating prior to witnessed onset of shaking with loss of responsiveness and foaming at the mouth lasting about a minute per S.O. Head CT normal except for some mild generalized volume loss. BAL neg.     Tremulous on admission but normal mentation. Started on gabapentin protocol and symptom-triggered diazepam per CIWA. Received 1 dose of diazepam again this AM 7/15. Appears to be improving. Tremulousness resolving.     Neuropathic discomfort is helped  "by gabapentin, but patient feels strong cravings, especially when the neuropathy is picking up, and is fearful he will relapse quickly if discharged. Open to starting naltrexone to help with his recovery by decreasing cravings.    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 The gabapentin seems to be helping with this.   - continue gabapentin    Elevated lactic acid    In ED, LA 5.1 with AG 32 and serum bicarb 16.  Normal VpH 7.41. No evidence of infection and doubt sepsis. Likely due to seizure and dehydration.     Resolved with IV fluids.     Hypokalemia, moderate    K 2.7 on arrival. Likely due to chronic alcohol use and increased adrenergic tone on admission. Placed on replacement protocol.     K 3.2   - continue replacement protocol    Hypomagnesemia    Mg 1.1 on arrival. Given magnesium sulfate bolus in ED.     Mg 1.6 on recheck. Given 2g magnesium sulfate IV.     Mg 1.3   - magnesium sulfate 4g IV today, recheck in AM      Acute kidney injury     Presented to ED with creatinine 1.48, baseline 0.81. Likely due to dehydration.     Resolved, creatinine 1.01    Clinically Significant Risk Factors     # Hypokalemia: Lowest K = 3.2 mmol/L in last 2 days, will replace as needed      # Hypomagnesemia: Lowest Mg = 1.2 mg/dL in last 2 days, will replace as needed                  # Overweight: Estimated body mass index is 27.6 kg/m  as calculated from the following:    Height as of this encounter: 1.88 m (6' 2\").    Weight as of this encounter: 97.5 kg (214 lb 15.2 oz)., PRESENT ON ADMISSION          Code Status Full Code    Physical Exam   Vital Signs: Temp: 97.9  F (36.6  C) Temp src: Oral BP: (!) 137/106 Pulse: 83   Resp: 18 SpO2: 98 % O2 Device: None (Room air)    Weight: 214 lbs 15.18 " oz    Gen: Well nourished, well developed, resting comfortably in bed, no acute distress  Head: atraumatic normocephalic; sclera non-injected, anicterric; oral mucosa moist  Neck: supple without spinal abnormality  Chest: clear to auscultation bilaterally, no wheezes, no rhonchi, no rales.  Cardiovascular: regular rate and rhythm, no gallops or rubs, no murmurs, no edema  Abdomen: bowel sounds normal, no hepatosplenomegaly, no masses, non-tender, non-distended, no guarding, no rebound tenderness  Musculoskeletal: normal muscle mass, normal muscle tone  Skin: no rashes, no chronic venous stasis  Lymph: no lymphadenopathy.  Neuro: cranial nerves II-XII intact, strength in all four extremities normal, reflexes normal, coordination normal.    45 MINUTES SPENT BY ME on the date of service doing chart review, history, exam, documentation & further activities per the note.     Gerber Beltre MD  [facility]  ______________________________________________________________________    Primary Care Physician   Physician No Ref-Primary    Discharge Orders      Hospital to Primary Care - Establish PCP Referral      Reason for your hospital stay    This is a 40 year old male admitted with alcohol withdrawal.     Activity    Your activity upon discharge: activity as tolerated     Full Code     Diet    Follow this diet upon discharge: Orders Placed This Encounter      Combination Diet Regular Diet Adult, Regular Diet         Significant Results and Procedures     Discharge Medications   Current Discharge Medication List        START taking these medications    Details   gabapentin (NEURONTIN) 300 MG capsule Take 1 capsule (300 mg) by mouth 3 times daily.  Qty: 90 capsule, Refills: 0    Associated Diagnoses: Alcohol withdrawal syndrome with complication, with unspecified complication (H)      naltrexone (DEPADE/REVIA) 50 MG tablet Take 1 tablet (50 mg) by mouth daily.  Qty: 30 tablet, Refills: 0     Associated Diagnoses: Alcohol withdrawal syndrome with complication, with unspecified complication (H)           Allergies   No Known Allergies

## 2025-07-16 NOTE — PLAN OF CARE
Goal Outcome Evaluation:      Plan of Care Reviewed With: patient    Overall Patient Progress: improvingOverall Patient Progress: improving    Outcome Evaluation: CIWA 1 this am, alert and oriented, pleasant and cooperative, no signs or syptoms of seizure activity, chem dep consult placed. plan for discharge this afternoon. slightly hypertensive 132/100, 137/106, pt will discuss with PCP.